# Patient Record
Sex: FEMALE | Race: WHITE | NOT HISPANIC OR LATINO | URBAN - METROPOLITAN AREA
[De-identification: names, ages, dates, MRNs, and addresses within clinical notes are randomized per-mention and may not be internally consistent; named-entity substitution may affect disease eponyms.]

---

## 2017-01-11 ENCOUNTER — INPATIENT (INPATIENT)
Facility: HOSPITAL | Age: 72
LOS: 3 days | Discharge: HOME | End: 2017-01-15
Attending: INTERNAL MEDICINE | Admitting: INTERNAL MEDICINE
Payer: MEDICARE

## 2017-01-11 DIAGNOSIS — Z90.710 ACQUIRED ABSENCE OF BOTH CERVIX AND UTERUS: Chronic | ICD-10-CM

## 2017-01-11 DIAGNOSIS — Z98.89 OTHER SPECIFIED POSTPROCEDURAL STATES: Chronic | ICD-10-CM

## 2017-01-11 PROCEDURE — 86079 PHYS BLOOD BANK SERV AUTHRJ: CPT

## 2017-01-11 PROCEDURE — 71010: CPT | Mod: 26

## 2017-01-11 PROCEDURE — 99291 CRITICAL CARE FIRST HOUR: CPT

## 2017-01-11 PROCEDURE — 93010 ELECTROCARDIOGRAM REPORT: CPT | Mod: 76

## 2017-01-19 DIAGNOSIS — K44.9 DIAPHRAGMATIC HERNIA WITHOUT OBSTRUCTION OR GANGRENE: ICD-10-CM

## 2017-01-19 DIAGNOSIS — T39.395A ADVERSE EFFECT OF OTHER NONSTEROIDAL ANTI-INFLAMMATORY DRUGS [NSAID], INITIAL ENCOUNTER: ICD-10-CM

## 2017-01-19 DIAGNOSIS — I10 ESSENTIAL (PRIMARY) HYPERTENSION: ICD-10-CM

## 2017-01-19 DIAGNOSIS — K31.9 DISEASE OF STOMACH AND DUODENUM, UNSPECIFIED: ICD-10-CM

## 2017-01-19 DIAGNOSIS — K22.70 BARRETT'S ESOPHAGUS WITHOUT DYSPLASIA: ICD-10-CM

## 2017-01-19 DIAGNOSIS — Z79.1 LONG TERM (CURRENT) USE OF NON-STEROIDAL ANTI-INFLAMMATORIES (NSAID): ICD-10-CM

## 2017-01-19 DIAGNOSIS — K31.82 DIEULAFOY LESION (HEMORRHAGIC) OF STOMACH AND DUODENUM: ICD-10-CM

## 2017-01-19 DIAGNOSIS — K92.0 HEMATEMESIS: ICD-10-CM

## 2017-01-19 DIAGNOSIS — K21.0 GASTRO-ESOPHAGEAL REFLUX DISEASE WITH ESOPHAGITIS: ICD-10-CM

## 2017-01-19 DIAGNOSIS — I95.9 HYPOTENSION, UNSPECIFIED: ICD-10-CM

## 2017-01-19 DIAGNOSIS — K25.9 GASTRIC ULCER, UNSPECIFIED AS ACUTE OR CHRONIC, WITHOUT HEMORRHAGE OR PERFORATION: ICD-10-CM

## 2017-01-19 DIAGNOSIS — E78.00 PURE HYPERCHOLESTEROLEMIA, UNSPECIFIED: ICD-10-CM

## 2017-01-19 DIAGNOSIS — D62 ACUTE POSTHEMORRHAGIC ANEMIA: ICD-10-CM

## 2017-01-19 DIAGNOSIS — E66.9 OBESITY, UNSPECIFIED: ICD-10-CM

## 2017-01-30 DIAGNOSIS — K92.2 GASTROINTESTINAL HEMORRHAGE, UNSPECIFIED: ICD-10-CM

## 2017-03-08 ENCOUNTER — TRANSCRIPTION ENCOUNTER (OUTPATIENT)
Age: 72
End: 2017-03-08

## 2017-04-04 ENCOUNTER — RESULT REVIEW (OUTPATIENT)
Age: 72
End: 2017-04-04

## 2018-06-05 ENCOUNTER — RECORD ABSTRACTING (OUTPATIENT)
Age: 73
End: 2018-06-05

## 2018-06-06 ENCOUNTER — RECORD ABSTRACTING (OUTPATIENT)
Age: 73
End: 2018-06-06

## 2018-06-06 DIAGNOSIS — S86.112D STRAIN OF OTHER MUSCLE(S) AND TENDON(S) OF POSTERIOR MUSCLE GROUP AT LOWER LEG LEVEL, LEFT LEG, SUBSEQUENT ENCOUNTER: ICD-10-CM

## 2018-06-06 DIAGNOSIS — S86.812A STRAIN OF OTHER MUSCLE(S) AND TENDON(S) AT LOWER LEG LEVEL, LEFT LEG, INITIAL ENCOUNTER: ICD-10-CM

## 2018-06-06 DIAGNOSIS — Z87.39 PERSONAL HISTORY OF OTHER DISEASES OF THE MUSCULOSKELETAL SYSTEM AND CONNECTIVE TISSUE: ICD-10-CM

## 2018-06-06 DIAGNOSIS — M76.50 PATELLAR TENDINITIS, UNSPECIFIED KNEE: ICD-10-CM

## 2018-06-06 DIAGNOSIS — M23.203 DERANGEMENT OF UNSPECIFIED MEDIAL MENISCUS DUE TO OLD TEAR OR INJURY, RIGHT KNEE: ICD-10-CM

## 2018-06-06 DIAGNOSIS — Z86.79 PERSONAL HISTORY OF OTHER DISEASES OF THE CIRCULATORY SYSTEM: ICD-10-CM

## 2018-06-06 DIAGNOSIS — Z78.9 OTHER SPECIFIED HEALTH STATUS: ICD-10-CM

## 2018-06-06 DIAGNOSIS — Z87.81 PERSONAL HISTORY OF (HEALED) TRAUMATIC FRACTURE: ICD-10-CM

## 2018-06-06 DIAGNOSIS — Z82.49 FAMILY HISTORY OF ISCHEMIC HEART DISEASE AND OTHER DISEASES OF THE CIRCULATORY SYSTEM: ICD-10-CM

## 2018-06-06 DIAGNOSIS — S86.119A STRAIN OF OTHER MUSCLE(S) AND TENDON(S) OF POSTERIOR MUSCLE GROUP AT LOWER LEG LEVEL, UNSPECIFIED LEG, INITIAL ENCOUNTER: ICD-10-CM

## 2018-06-06 DIAGNOSIS — S83.289A OTHER TEAR OF LATERAL MENISCUS, CURRENT INJURY, UNSPECIFIED KNEE, INITIAL ENCOUNTER: ICD-10-CM

## 2018-06-06 DIAGNOSIS — S83.282A OTHER TEAR OF LATERAL MENISCUS, CURRENT INJURY, LEFT KNEE, INITIAL ENCOUNTER: ICD-10-CM

## 2018-06-06 DIAGNOSIS — Z86.39 PERSONAL HISTORY OF OTHER ENDOCRINE, NUTRITIONAL AND METABOLIC DISEASE: ICD-10-CM

## 2018-06-06 DIAGNOSIS — S83.249A OTHER TEAR OF MEDIAL MENISCUS, CURRENT INJURY, UNSPECIFIED KNEE, INITIAL ENCOUNTER: ICD-10-CM

## 2018-06-06 RX ORDER — ATENOLOL 25 MG/1
25 TABLET ORAL DAILY
Refills: 0 | Status: ACTIVE | COMMUNITY

## 2018-06-06 RX ORDER — ROSUVASTATIN CALCIUM 10 MG/1
10 TABLET, FILM COATED ORAL DAILY
Refills: 0 | Status: ACTIVE | COMMUNITY

## 2018-06-06 RX ORDER — VALSARTAN AND HYDROCHLOROTHIAZIDE 320; 25 MG/1; MG/1
320-25 TABLET, FILM COATED ORAL DAILY
Refills: 0 | Status: ACTIVE | COMMUNITY

## 2018-07-20 ENCOUNTER — APPOINTMENT (OUTPATIENT)
Dept: ORTHOPEDIC SURGERY | Facility: CLINIC | Age: 73
End: 2018-07-20
Payer: MEDICARE

## 2018-07-20 VITALS
HEART RATE: 56 BPM | BODY MASS INDEX: 35.7 KG/M2 | WEIGHT: 194 LBS | HEIGHT: 62 IN | DIASTOLIC BLOOD PRESSURE: 80 MMHG | SYSTOLIC BLOOD PRESSURE: 122 MMHG

## 2018-07-20 DIAGNOSIS — R07.81 PLEURODYNIA: ICD-10-CM

## 2018-07-20 DIAGNOSIS — S29.011A STRAIN OF MUSCLE AND TENDON OF FRONT WALL OF THORAX, INITIAL ENCOUNTER: ICD-10-CM

## 2018-07-20 PROCEDURE — 99214 OFFICE O/P EST MOD 30 MIN: CPT

## 2018-07-20 PROCEDURE — 71100 X-RAY EXAM RIBS UNI 2 VIEWS: CPT | Mod: RT

## 2018-12-07 ENCOUNTER — APPOINTMENT (OUTPATIENT)
Dept: ORTHOPEDIC SURGERY | Facility: CLINIC | Age: 73
End: 2018-12-07
Payer: MEDICARE

## 2018-12-07 VITALS — BODY MASS INDEX: 35.7 KG/M2 | WEIGHT: 194 LBS | HEIGHT: 62 IN

## 2018-12-07 PROCEDURE — 99213 OFFICE O/P EST LOW 20 MIN: CPT

## 2019-03-12 ENCOUNTER — TRANSCRIPTION ENCOUNTER (OUTPATIENT)
Age: 74
End: 2019-03-12

## 2019-03-15 ENCOUNTER — APPOINTMENT (OUTPATIENT)
Dept: ORTHOPEDIC SURGERY | Facility: CLINIC | Age: 74
End: 2019-03-15
Payer: MEDICARE

## 2019-03-15 VITALS — WEIGHT: 194 LBS | BODY MASS INDEX: 35.7 KG/M2 | HEIGHT: 62 IN

## 2019-03-15 DIAGNOSIS — M43.22 FUSION OF SPINE, CERVICAL REGION: ICD-10-CM

## 2019-03-15 PROCEDURE — 99213 OFFICE O/P EST LOW 20 MIN: CPT

## 2019-06-21 ENCOUNTER — APPOINTMENT (OUTPATIENT)
Dept: ORTHOPEDIC SURGERY | Facility: CLINIC | Age: 74
End: 2019-06-21
Payer: MEDICARE

## 2019-06-21 VITALS — BODY MASS INDEX: 35.7 KG/M2 | HEIGHT: 62 IN | WEIGHT: 194 LBS

## 2019-06-21 PROCEDURE — 99213 OFFICE O/P EST LOW 20 MIN: CPT

## 2019-06-21 NOTE — PHYSICAL EXAM
[LE] : Sensory: Intact in bilateral lower extremities [de-identified] : Constitutional\par o Appearance : well-nourished, well developed, alert, in no acute distress \par Head and Face\par o Head :\par ¦ Inspection : atraumatic, normocephalic\par o Face :\par ¦ Inspection : no visible rash or discoloration\par Respiratory\par o Respiratory Effort: breathing unlabored \par Neurologic\par o Mental Status Examination :\par ¦ Orientation : alert and oriented X 3\par Psychiatric\par o Mood and Affect: mood normal, affect appropriate\par Cardiovascular\par o Observation/Palpation : - no swelling\par Lymphatic\par o Additional Nodes : No palpable lymph nodes present\par \par Right Ribs\par o Inspection/Palpation : normal appearance, mild intercostal tenderness posterior to anterior 6-8th ribs\par o Stability : no rib instability present on palpation, mild pain with compression\par \par Lumbosacral Spine\par o Inspection : no visible rash or discoloration\par o Palpation : paraspinal musculature minimally tender\par o Range of Motion : extension and sidebending to the right causes discomfort and right leg pain, forward flexion does not cause discomfort, rotation to right gives pain to right knee\par o Muscle Strength : paraspinal muscle strength and tone within normal limits\par o Muscle Tone : paraspinal muscle strength and tone within normal limits\par Tests: straight leg test negative  on the left mildly positive on the right, positive keily on left, \par \par hamstring: well stretched \par  \par Right Lower Extremity\par o Buttock : no tenderness, swelling or deformities\par o Right Hip :\par ¦ Inspection/Palpation : no tenderness, swelling or deformities\par ¦ Range of Motion : full and painless in all planes, no crepitance\par ¦ Stability : joint stability intact\par ¦ Strength : extension, flexion, adduction, abduction, internal rotation and external rotation 4+/5 \par \par o Right Knee :\par ¦ Inspection/Palpation : Peripatellar tenderness to palpation, no swelling\par ¦ Range of Motion : active flexion and extension full and painless, no crepitance\par ¦ Stability : no valgus or varus instability present on provocative testing\par ¦ Strength : flexion and extension 5/5\par ¦ Tests and Signs : Anterior Drawer negative, Lachman negative, Jacques's negative \par o Reflexes : patellar tendon reflex 2+, ankle reflex 2+, Babinski sign absent (toes downgoing) \par \par Left Lower Extremity\par o Buttock : no tenderness, swelling or deformities \par o Left Hip :\par ¦ Inspection/Palpation : no tenderness, swelling or deformities\par ¦ Range of Motion : full and painless in all planes, no crepitance\par ¦ Stability : joint stability intact\par ¦ Strength : extension, flexion, adduction, abduction, internal rotation and external rotation 4+/5\par \par o Left Knee :\par ¦ Inspection/Palpation : medial joint line and peripatellar tenderness to palpation, no swelling\par ¦ Range of Motion : active flexion and extension full and painless, no crepitance\par ¦ Stability : no valgus or varus instability present on provocative testing\par ¦ Strength : flexion and extension 5/5\par ¦ Tests and Signs : Anterior Drawer negative, Lachman negative, Jacques's negative, Keily's test positive with pain radiating down right leg\par o Reflexes : patellar tendon reflex 2+, ankle reflex 2+, Babinski sign absent (toes downgoing) \par  \par Gait: gait normal, no significant extremity swelling or lymphedema, excellent core strength, No foot drop bilaterally.\par

## 2019-06-21 NOTE — DISCUSSION/SUMMARY
[de-identified] : She will continue with physical therapy for strengthening and flexibility of her lower back. A prescription for physical therapy was provided. We discussed the use of ice and Tylenol to relieve pain. I encouraged her to start walking when the weather gets warmer and walk with a walking stick. She'll follow up in 2 months. I recommend sneakers or reinforced sandals with a strap. Do log rolls in bed to prevent radiating pain. \par \par

## 2019-06-21 NOTE — HISTORY OF PRESENT ILLNESS
[de-identified] : Patient has been going to physical therapy for her lower back pain and she reports that she is feeling significantly better. She is diligent with doing the exercises on her own. She would like to continue going to PT. She has received an Epidural injection over 3 years ago. She is doing leg extension and leg presses at physical therapy, which doesn’t hurt knees or back. There are no exercises in therapy hurting knees or back. She states she wants to continue the therapy.  She iis going to [place] in August and is concerned about the extensive walking.

## 2019-06-21 NOTE — ADDENDUM
[FreeTextEntry1] : I, Meek Mclaughlin, acted solely as a scribe for Dr. Lopez on 06/21/2019  .\par  \par All medical record entries made by the scribe were at my, Dr. Lopez, direction and personally dictated by me on 06/21/2019. I have reviewed the chart and agree that the record accurately reflects my personal performance of the history, physical exam, assessment and plan. I have also personally directed, reviewed, and agreed with the chart.\par

## 2019-09-13 ENCOUNTER — APPOINTMENT (OUTPATIENT)
Dept: ORTHOPEDIC SURGERY | Facility: CLINIC | Age: 74
End: 2019-09-13
Payer: MEDICARE

## 2019-09-13 VITALS — HEIGHT: 62 IN | WEIGHT: 194 LBS | BODY MASS INDEX: 35.7 KG/M2

## 2019-09-13 PROCEDURE — 99213 OFFICE O/P EST LOW 20 MIN: CPT

## 2019-09-13 PROCEDURE — 73562 X-RAY EXAM OF KNEE 3: CPT | Mod: LT

## 2019-09-13 PROCEDURE — 72170 X-RAY EXAM OF PELVIS: CPT

## 2019-09-13 PROCEDURE — 72100 X-RAY EXAM L-S SPINE 2/3 VWS: CPT

## 2020-01-03 ENCOUNTER — APPOINTMENT (OUTPATIENT)
Dept: ORTHOPEDIC SURGERY | Facility: CLINIC | Age: 75
End: 2020-01-03
Payer: MEDICARE

## 2020-01-03 PROCEDURE — 99213 OFFICE O/P EST LOW 20 MIN: CPT

## 2020-01-03 NOTE — ADDENDUM
[FreeTextEntry1] : I, Meek Mclaughlin, acted solely as a scribe for Dr. Lopez on 01/03/2020  .\par  \par All medical record entries made by the scribe were at my, Dr. Lopez, direction and personally dictated by me on 01/03/2020. I have reviewed the chart and agree that the record accurately reflects my personal performance of the history, physical exam, assessment and plan. I have also personally directed, reviewed, and agreed with the chart.

## 2020-01-03 NOTE — DISCUSSION/SUMMARY
[de-identified] : I went over the pathopsychology of the patient's symptoms in great detail and used models to aid in my explanation. I am recommending the patient continue going to physical therapy to obtain increases in their strength and mobility of lumbar spine and knee. A prescription for PT was provided to the patient.  If there is no significant improvement upon a month of therapy repeat MRI of her lumbosacral spine may be considered.\par \par  FU 6 weeks.

## 2020-01-03 NOTE — PHYSICAL EXAM
[LE] : Sensory: Intact in bilateral lower extremities [de-identified] : Doloris:\par \par Constitutional\par o Appearance : well-nourished, well developed, alert, in no acute distress \par Head and Face\par o Head :\par ¦ Inspection : atraumatic, normocephalic\par o Face :\par ¦ Inspection : no visible rash or discoloration\par Respiratory\par o Respiratory Effort: breathing unlabored \par Neurologic\par o Mental Status Examination :\par ¦ Orientation : alert and oriented X 3\par Psychiatric\par o Mood and Affect: mood normal, affect appropriate\par Cardiovascular\par o Observation/Palpation : - no swelling\par Lymphatic\par o Additional Nodes : No palpable lymph nodes present\par \par Right Ribs\par o Inspection/Palpation : normal appearance, mild intercostal tenderness posterior to anterior 6-8th ribs\par o Stability : no rib instability present on palpation, mild pain with compression\par \par Lumbosacral Spine\par o Inspection : no visible rash or discoloration, no tenderness lumbar spine \par o Palpation : paraspinal musculature minimally tender\par o Range of Motion : extension and  flexion causes pain down right leg, sidebending to the right causes discomfort, flexion relieves pain, rotation to right is worse than left\par o Muscle Strength : paraspinal muscle strength and tone within normal limits\par o Muscle Tone : paraspinal muscle strength and tone within normal limits\par Tests: straight leg test negative  \par hamstring: are tight \par  \par Right Lower Extremity\par o Buttock : no tenderness, swelling or deformities\par o Right Hip :\par ¦ Inspection/Palpation : no tenderness, swelling or deformities\par ¦ Range of Motion : full and painless in all planes, no crepitance\par ¦ Stability : joint stability intact\par ¦ Strength : extension, flexion, adduction, abduction, internal rotation and external rotation 4+/5 \par \par o Right Knee :\par ¦ Inspection/Palpation : lateral compartment tenderness to palpation, no swelling\par ¦ Range of Motion : active flexion and extension full and painless, no crepitance\par ¦ Stability : no valgus or varus instability present on provocative testing\par ¦ Strength : flexion and extension 4-/5\par ¦ Tests and Signs : Anterior Drawer negative, Lachman negative, Jacques's negative \par o Reflexes : patellar tendon reflex 2+, ankle reflex 2+, Babinski sign absent (toes downgoing) \par \par Left Lower Extremity\par o Buttock : no tenderness, swelling or deformities \par o Left Hip :\par ¦ Inspection/Palpation : no tenderness, swelling or deformities, left greater trochanter tenderness \par ¦ Range of Motion : full flexion slight limitation of rotation,\par ¦ Stability : joint stability intact\par ¦ Strength : extension, flexion, adduction, abduction, internal rotation and external rotation 4+/5\par \par o Left Knee :\par ¦ Inspection/Palpation : medial joint line and peripatellar tenderness to palpation, no swelling\par ¦ Range of Motion : active flexion and extension full and painless, no crepitance\par ¦ Stability : no valgus or varus instability present on provocative testing\par ¦ Strength : flexion and extension 4-/5\par ¦ Tests and Signs : Anterior Drawer negative, Lachman negative, Jacques's negative, Viktor's test positive with pain radiating down right leg\par o Reflexes : patellar tendon reflex 2+, ankle reflex 2+, Babinski sign absent (toes downgoing) \par  \par Gait: kyphotic ,  no significant swelling extremity swelling or lymphedema\par

## 2020-01-03 NOTE — HISTORY OF PRESENT ILLNESS
[de-identified] : 73 year old patient presents with lower back pain. Patient has been going to physical therapy for her lower back pain and she reports that she is feeling significantly better. She is diligent with doing the exercises on her own. She would like to continue going to PT.  She has been able to walk long distances with a walking stick. She is having pain on the anterior aspect of her left knee. She states she has been bending and doing holiday decoration frequently. The patient states that She has difficulty when ascending and descending stairs. She has been diligent with her home exercises. She is present with her . Previous xrays patellofemoral and medial compartment arthritis left knee are f rom 9/2019 .

## 2020-02-28 ENCOUNTER — APPOINTMENT (OUTPATIENT)
Dept: ORTHOPEDIC SURGERY | Facility: CLINIC | Age: 75
End: 2020-02-28
Payer: MEDICARE

## 2020-02-28 PROCEDURE — 73562 X-RAY EXAM OF KNEE 3: CPT | Mod: RT

## 2020-02-28 PROCEDURE — 20611 DRAIN/INJ JOINT/BURSA W/US: CPT | Mod: RT

## 2020-02-28 PROCEDURE — 99214 OFFICE O/P EST MOD 30 MIN: CPT | Mod: 25

## 2020-06-19 ENCOUNTER — APPOINTMENT (OUTPATIENT)
Dept: ORTHOPEDIC SURGERY | Facility: CLINIC | Age: 75
End: 2020-06-19
Payer: MEDICARE

## 2020-06-19 VITALS — BODY MASS INDEX: 35.7 KG/M2 | WEIGHT: 194 LBS | HEIGHT: 62 IN

## 2020-06-19 PROCEDURE — 99213 OFFICE O/P EST LOW 20 MIN: CPT

## 2020-06-19 NOTE — PHYSICAL EXAM
[LE] : Sensory: Intact in bilateral lower extremities [de-identified] : Constitutional\par o Appearance : well-nourished, well developed, alert, in no acute distress \par Head and Face\par o Head :\par ¦ Inspection : atraumatic, normocephalic\par o Face :\par ¦ Inspection : no visible rash or discoloration\par Respiratory\par o Respiratory Effort: breathing unlabored \par Neurologic\par o Mental Status Examination :\par ¦ Orientation : alert and oriented X 3\par Psychiatric\par o Mood and Affect: mood normal, affect appropriate\par Cardiovascular\par o Observation/Palpation : - no swelling\par Lymphatic\par o Additional Nodes : No palpable lymph nodes present\par \par Lumbosacral Spine\par o Inspection : no visible rash or discoloration\par o Palpation : no paraspinal musculature tenderness\par o Range of Motion : arc of motion full in all planes, no crepitance with ROM, extension and sidebending to the left and right cause discomfort, no pain with rotation\par o Muscle Strength : paraspinal muscle strength and tone within normal limits\par o Muscle Tone : paraspinal muscle strength and tone within normal limits\par Tests: straight leg test negative and LIAT test negative bilaterally\par \par Right Lower Extremity\par o Buttock : no tenderness, swelling or deformities \par o Right Hip :\par ¦ Inspection/Palpation : no tenderness, swelling or deformities\par ¦ Range of Motion : full and painless in all planes, no crepitance\par ¦ Stability : joint stability intact\par ¦ Strength : extension, flexion, adduction, abduction, internal rotation and external rotation, 4+/5\par \par o Right Knee :\par ¦ Inspection/Palpation : mild medial and lateral compartment tenderness to palpation, no swelling, valgus deformity, arthroscopic portals are well healed. \par ¦ Range of Motion : active flexion and extension full, pain on full flexion, patellofemoral  crepitance\par ¦ Stability : no valgus or varus instability present on provocative testing\par ¦ Strength : flexion and extension 4+/5\par ¦ Tests and Signs : negative Anterior Drawer, negative Lachman, negative Jacques\par \par Left Lower Extremity\par o Buttock : no tenderness, swelling or deformities \par o Left Hip :\par ¦ Inspection/Palpation : no tenderness, no swelling or deformities\par ¦ Range of Motion : full and painless in all planes, no crepitance\par ¦ Stability : joint stability intact\par ¦ Strength : extension, flexion, adduction, abduction, internal rotation and external rotation, 4+/5\par \par o Left Knee :\par ¦ Inspection/Palpation : medial and lateral compartment tenderness to palpation, no swelling, valgus deformity\par ¦ Range of Motion : active flexion and extension full and painless, no crepitance\par ¦ Stability : no valgus or varus instability present on provocative testing\par ¦ Strength : flexion and extension 4+/5\par ¦ Tests and Signs : negative Anterior Drawer, negative Lachman, negative Jacques\par \par Gait and Station:\par Gait: gait normal, no significant extremity swelling or lymphedema, good proprioception and balance, no foot drop bilaterally, good core strength, valgus deformity bilateral knees

## 2020-06-19 NOTE — HISTORY OF PRESENT ILLNESS
[de-identified] : 74 year old female presents for follow up after receiving Monovisc in her right knee on 2/28/2020. She is known to have bone on bone in the lateral compartment of her right knee. She has minimal pain in her right knee. She is thrilled with her progress and results. She also notes that her low back is feeling great, and she has not received back injections recently. She has been working diligently with a home exercise program and reports that she has maintained her strength and ROM. She has been walking 3 miles a day using a walking stick without pain. She no longer has radiating pain down her legs. She notes that she has been out of PT due to COVID-19, but would like a prescription to start it again.  She notes she is thrilled with her result and is able to walk long distances without significant discomfort. She is in the process of trying to lose weight as well.\par \par Radiology Results taken 02/28/2020 revealed:\par o Right Knee : Standing AP,lateral and tunnel views of the knee were obtained and revealed bone on bone in the lateral compartment with significant patellofemoral arthritis

## 2020-10-02 ENCOUNTER — APPOINTMENT (OUTPATIENT)
Dept: ORTHOPEDIC SURGERY | Facility: CLINIC | Age: 75
End: 2020-10-02
Payer: MEDICARE

## 2020-10-02 DIAGNOSIS — M17.10 UNILATERAL PRIMARY OSTEOARTHRITIS, UNSPECIFIED KNEE: ICD-10-CM

## 2020-10-02 PROCEDURE — 99214 OFFICE O/P EST MOD 30 MIN: CPT

## 2020-10-02 PROCEDURE — 73562 X-RAY EXAM OF KNEE 3: CPT | Mod: RT

## 2021-01-15 ENCOUNTER — APPOINTMENT (OUTPATIENT)
Dept: ORTHOPEDIC SURGERY | Facility: CLINIC | Age: 76
End: 2021-01-15
Payer: MEDICARE

## 2021-01-15 PROCEDURE — 20611 DRAIN/INJ JOINT/BURSA W/US: CPT | Mod: RT

## 2021-01-15 PROCEDURE — 99214 OFFICE O/P EST MOD 30 MIN: CPT | Mod: 25

## 2021-04-30 ENCOUNTER — APPOINTMENT (OUTPATIENT)
Dept: ORTHOPEDIC SURGERY | Facility: CLINIC | Age: 76
End: 2021-04-30
Payer: MEDICARE

## 2021-04-30 PROCEDURE — 99214 OFFICE O/P EST MOD 30 MIN: CPT

## 2021-04-30 NOTE — PHYSICAL EXAM
[LE] : Sensory: Intact in bilateral lower extremities [de-identified] : Constitutional\par o Appearance : well-nourished, well developed, alert, in no acute distress \par Head and Face\par o Head :\par ¦ Inspection : atraumatic, normocephalic\par o Face :\par ¦ Inspection : no visible rash or discoloration\par Respiratory\par o Respiratory Effort: breathing unlabored \par Neurologic\par o Mental Status Examination :\par ¦ Orientation : alert and oriented X 3\par Psychiatric\par o Mood and Affect: mood normal, affect appropriate\par Cardiovascular\par o Observation/Palpation : - no swelling\par Lymphatic\par o Additional Nodes : No palpable lymph nodes present\par \par Lumbosacral Spine\par o Inspection : no visible rash or discoloration\par o Palpation : no paraspinal musculature tenderness\par o Range of Motion : extension and sidebending and rotation to the right cause discomfort that radiates to the right ankle\par o Muscle Strength : paraspinal muscle strength and tone within normal limits\par o Muscle Tone : paraspinal muscle strength and tone within normal limits\par Tests: straight leg test negative and LIAT test negative bilaterally\par \par Right Lower Extremity\par o Buttock : no tenderness, swelling or deformities \par o Right Hip :\par ¦ Inspection/Palpation : no tenderness, swelling or deformities\par ¦ Range of Motion : full and painless in all planes, no crepitance\par ¦ Stability : joint stability intact\par ¦ Strength : extension, flexion, adduction, abduction, internal rotation and external rotation, 4+/5\par \par o Right Knee :\par ¦ Inspection/Palpation : medial and mild lateral compartment tenderness, no swelling, no warmth, valgus deformity, well-healed arthroscopic portals \par ¦ Range of Motion : FROM, pain with full flexion, patellofemoral crepitance with painful patellofemoral glide\par ¦ Stability : no valgus or varus instability present on provocative testing\par ¦ Strength : flexion and extension 4+/5\par ¦ Tests and Signs : negative Anterior Drawer, negative Lachman, negative Jacques\par \par Left Lower Extremity\par o Buttock : no tenderness, swelling or deformities \par o Left Hip :\par ¦ Inspection/Palpation : no tenderness, no swelling or deformities\par ¦ Range of Motion : full and painless in all planes, no crepitance\par ¦ Stability : joint stability intact\par ¦ Strength : extension, flexion, adduction, abduction, internal rotation and external rotation, 4+/5\par \par o Left Knee :\par ¦ Inspection/Palpation : medial and lateral compartment tenderness to palpation, no swelling, valgus deformity\par ¦ Range of Motion : active flexion and extension full and painless, no crepitance\par ¦ Stability : no valgus or varus instability present on provocative testing\par ¦ Strength : flexion and extension 4+/5\par ¦ Tests and Signs : negative Anterior Drawer, negative Lachman, negative Jacques\par \par o Left Ankle :\par ¦ Inspection/Palpation : midfoot tenderness, no posterior tibial tendon tenderness, no swelling    \par ¦ Range of Motion : arc of motion full and painless in all planes\par ¦ Stability : no joint instability en provocative testing\par ¦ Strength : all muscles 5/5\par o Skin : no erythema or ecchymosis present\par o Sensation : sensation to pin intact\par o Tests and Signs : Doyle test negative \par \par o Left Foot:\par ¦ Inspection/Palpation : midfoot tenderness to palpation, no swelling, hammertoes of all toes\par ¦ Range of Motion : arc of motion full and painless in all planes\par ¦ Stability : no joint instability on provocative testing\par ¦ Strength : all muscles 5/5\par o Skin : no erythema or ecchymosis present \par \par Gait and Station:\par Gait: gait normal, valgus deformity of both knees, mild planovalgus deformity bilaterally, pain with heel and toe walking on the left, no significant extremity swelling or lymphedema, good proprioception and balance, no foot drop, good core strength

## 2021-04-30 NOTE — HISTORY OF PRESENT ILLNESS
[de-identified] : 75 year old female presents with right knee pain. She received a Monovisc injection on 1/15/2021 and reports relief. She is still going to PT for her knee and back and feels it is very helpful. She notes her back did act up after doing a lot of bending and twisting, but it has quieted down. She notes that about 1.5 months ago she fell onto both knees. She is working on losing weight. \par She is also complaining of left foot and ankle pain since January. There was no injury. She notes pain that starts in the foot and shoots up into the ankle. She has been seeing a podiatrist (Dr. Jeramie Mackay). He told her that she needed custom orthotics but they did not help. He took the orthotics back to adjust and shave them down. She has been using OTC arch supports. She is also in PT for her ankle without relief. 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 \par \par MRI of the Left Ankle obtained at [facility] on 2/11/2021 revealed:\par 1. Flattened appearance of the peroneus brevis at the level of the lateral malleolus, consistent with the presence of a longitudinal split.\par 2. Mild tibialis posterior tendinosis.\par 3. Mild soft tissue edema around the ankle.\par 4. Mild degenerative changes in the midfoot.\par \par Radiology Results done 10/2/2020:\par o Right Knee : Standing AP,lateral and tunnel views of the knee were obtained and revealed severe lateral and patellofemoral arthritis.

## 2021-04-30 NOTE — DISCUSSION/SUMMARY
[de-identified] : I went over the pathophysiology of the patient's symptoms in great detail with the patient. We discussed the use of ice, Tylenol and anti-inflammatories to relieve pain. I stressed the importance of continued weight loss and its benefits to the patient’s joints and overall health. I am recommending the patient continue going to physical therapy to obtain increases in their strength and mobility. A prescription for PT was provided. \par Regarding her ankle: I discussed the underlying pathophysiology of the patient's condition in great detail with the patient. I went over the patient's x-rays and MRI with them in great detail. I discussed ergonomic solutions with the patient such as wearing a stiff shoe with a thicker sole. We will also work on her foot and ankle in PT. She should bring the orthotics to her next visit. If her pain does not improve with further PT and her orthotics, then a cortisone injection may be considered. All of her questions were answered. She understands and consents to the plan. \par \par FU 1 month.\par after continuing PT for her right knee, low back and left foot and ankle.\par \par The patient is an 75 year old female with bone on bone arthritis of their right knee. Based upon the patient's continued symptoms and failure to respond to conservative treatment I have recommended a total knee replacement for this patient. A long discussion took place with the patient describing what a total joint replacement is and what the procedure would entail. A total knee model, similar to the implant that will be used during the operation was utilized to demonstrate and to discuss the various bearing surfaces of the implants. The hospitalization and post-operative care and rehabilitation were also discussed. The use of perioperative antibiotics and DVT prophylaxis were discussed. The risk, benefits and alternatives to a surgical intervention were discussed at length with the patient. The patient was also advised of risks related to the medical comorbidities and elevated body mass index (BMI). A lengthy discussion took place to review the most common complications including but not limited to: deep vein thrombosis, pulmonary embolus, heart attack, stroke, infection, wound breakdown, numbness, damage to nerves, tendon, muscles, arteries or other blood vessels, death and other possible complications from anesthesia. The patient was told that we will take steps to minimize these risks by using sterile technique, antibiotics and DVT prophylaxis when appropriate and follow the patient postoperatively in the office setting to monitor progress. The possibility of recurrent pain, no improvement in pain and actual worsening of pain were also discussed with the patient.\par \par The discharge plan of care focused on the patient going home following surgery. I encouraged the patient to make the necessary arrangements to have someone stay with them when they are discharged home. Following discharge, a home care nurse will visit the patient. They will open your home care case and request home physical therapy services. Home physical therapy will commence following discharge provided it is appropriate and covered by her health insurance benefit plan.\par \par The risks, benefits and alternative treatment options of total joint replacement were reviewed with the patient at great length. All questions were answered to the satisfaction of the patient. The patient participated in an interactive discussion of the total knee replacement implant planned for their surgery with questions answered. The patient agreed with the treatment plan, and has decided to move forward with the elective total knee replacement as planned.\par \par DELPHINE BEAULIEU was seen face to face and needs a commode for bedside use as the patient has no ability to acces the bathroom in their home. The patient also requires a rolling walker as this is needed for activities of daily living within their home secondary to the diagnosis of osteoarthritis.

## 2021-04-30 NOTE — REASON FOR VISIT
[Follow-Up Visit] : a follow-up visit for [Knee Pain] : knee pain [Spouse] : spouse [FreeTextEntry2] : left ankle and foot pain

## 2021-04-30 NOTE — ADDENDUM
[FreeTextEntry1] : I, Von Rivera, acted solely as a scribe for Dr. Fracisco Lopez on this date 04/30/2021.\par All medical record entries made by the Scribe were at my, Dr. Fracisco Lopez, direction and personally dictated by me on 04/30/2021. I have reviewed the chart and agree that the record accurately reflects my personal performance of the history, physical exam, assessment and plan. I have also personally directed, reviewed, and agreed with the chart.

## 2021-06-11 ENCOUNTER — APPOINTMENT (OUTPATIENT)
Dept: ORTHOPEDIC SURGERY | Facility: CLINIC | Age: 76
End: 2021-06-11
Payer: MEDICARE

## 2021-06-11 VITALS
DIASTOLIC BLOOD PRESSURE: 86 MMHG | SYSTOLIC BLOOD PRESSURE: 162 MMHG | WEIGHT: 168 LBS | HEIGHT: 61 IN | HEART RATE: 61 BPM | BODY MASS INDEX: 31.72 KG/M2

## 2021-06-11 PROCEDURE — 20605 DRAIN/INJ JOINT/BURSA W/O US: CPT | Mod: LT

## 2021-06-11 PROCEDURE — 99214 OFFICE O/P EST MOD 30 MIN: CPT | Mod: 25

## 2021-06-11 NOTE — PHYSICAL EXAM
[LE] : Sensory: Intact in bilateral lower extremities [de-identified] : Constitutional\par o Appearance : well-nourished, well developed, alert, in no acute distress \par Head and Face\par o Head :\par ¦ Inspection : atraumatic, normocephalic\par o Face :\par ¦ Inspection : no visible rash or discoloration\par Respiratory\par o Respiratory Effort: breathing unlabored \par Neurologic\par o Mental Status Examination :\par ¦ Orientation : alert and oriented X 3\par Psychiatric\par o Mood and Affect: mood normal, affect appropriate\par Cardiovascular\par o Observation/Palpation : - no swelling\par Lymphatic\par o Additional Nodes : No palpable lymph nodes present\par \par Lumbosacral Spine\par o Inspection : no visible rash or discoloration\par o Palpation : no paraspinal musculature tenderness\par o Range of Motion : extension and sidebending and rotation to the right cause discomfort that radiates to the right ankle\par o Muscle Strength : paraspinal muscle strength and tone within normal limits\par o Muscle Tone : paraspinal muscle strength and tone within normal limits\par Tests: straight leg test negative and LIAT test negative bilaterally\par \par Right Lower Extremity\par o Buttock : no tenderness, swelling or deformities \par o Right Hip :\par ¦ Inspection/Palpation : no tenderness, swelling or deformities\par ¦ Range of Motion : full and painless in all planes, no crepitance\par ¦ Stability : joint stability intact\par ¦ Strength : extension, flexion, adduction, abduction, internal rotation and external rotation, 4+/5\par \par o Right Knee :\par ¦ Inspection/Palpation : medial and mild lateral compartment tenderness, no swelling, no warmth, valgus deformity, well-healed arthroscopic portals \par ¦ Range of Motion : FROM, pain with full flexion, patellofemoral crepitance with painful patellofemoral glide\par ¦ Stability : no valgus or varus instability present on provocative testing\par ¦ Strength : flexion and extension 4+/5\par ¦ Tests and Signs : negative Anterior Drawer, negative Lachman, negative Jacques\par \par Left Lower Extremity\par o Buttock : no tenderness, swelling or deformities \par o Left Hip :\par ¦ Inspection/Palpation : no tenderness, no swelling or deformities\par ¦ Range of Motion : full and painless in all planes, no crepitance\par ¦ Stability : joint stability intact\par ¦ Strength : extension, flexion, adduction, abduction, internal rotation and external rotation, 4+/5\par \par o Left Knee :\par ¦ Inspection/Palpation : medial and lateral compartment tenderness to palpation, no swelling, valgus deformity\par ¦ Range of Motion : active flexion and extension full and painless, no crepitance\par ¦ Stability : no valgus or varus instability present on provocative testing\par ¦ Strength : flexion and extension 4+/5\par ¦ Tests and Signs : negative Anterior Drawer, negative Lachman, negative Jacques\par \par o Left Ankle :\par ¦ Inspection/Palpation : tenderness over the posterior tibial tendon and over the insertion into the navicular, mild tenderness over the peroneal tendon, no swelling    \par ¦ Range of Motion : arc of motion full and painless in all planes\par ¦ Stability : no joint instability en provocative testing\par ¦ Strength : all muscles 5/5\par o Skin : no erythema or ecchymosis present\par o Sensation : sensation to pin intact\par o Tests and Signs : Doyle test negative \par \par o Left Foot:\par ¦ Inspection/Palpation : midfoot tenderness to palpation, no swelling, hammertoes of all toes\par ¦ Range of Motion : arc of motion full and painless in all planes\par ¦ Stability : no joint instability on provocative testing\par ¦ Strength : all muscles 5/5\par o Skin : no erythema or ecchymosis present \par \par Gait and Station:\par Gait: stiff with poor pushoff on the left, valgus deformity of both knees, mild planovalgus deformity bilaterally that is not well corrected by her orthotics, pain with heel and toe walking on the left, no significant extremity swelling or lymphedema, good proprioception and balance, no foot drop, good core strength\par \par o Left Ankle injection : Anatomic location- left area of the posterior tibial tendon, Spray - area was sterilized with Betadine and alcohol and anesthetized with Ethyl Chloride, needle used-20G, Medications given- 1cc's lidocaine, 0.5cc's kenalog, 0.5 cc's dexamethasone, and patient tolerated it well.

## 2021-06-11 NOTE — DISCUSSION/SUMMARY
[de-identified] : I went over the pathophysiology of the patient's symptoms in great detail with the patient. We discussed the use of ice, Tylenol, anti-inflammatories, lidocaine patches and Voltaren gel to relieve pain. The patient elected to receive a cortisone injection into her left ankle today, and tolerated it well. I instructed the patient on ROM exercises, and told them to take it easy. The use of ice and rest was reviewed with the patient. The patient may resume activities tomorrow. A discussion ensued about shoe wear modifications and I recommend that she continues wearing the sandals. I advised her to speak with her podiatrist about modifying her orthotics. She should limit excessive walking. All of her questions were answered. She understands and consents to the plan. This treatment plan was discussed and reviewed with the patient's spouse who agrees with the treatment course. \par \par FU 1 month.\par after a left ankle cortisone injection in the area of the posterior tibial tendon today (06/11/2021). \par \par The patient is an 75 year old female with bone on bone arthritis of their right knee. Based upon the patient's continued symptoms and failure to respond to conservative treatment I have recommended a total knee replacement for this patient. A long discussion took place with the patient describing what a total joint replacement is and what the procedure would entail. A total knee model, similar to the implant that will be used during the operation was utilized to demonstrate and to discuss the various bearing surfaces of the implants. The hospitalization and post-operative care and rehabilitation were also discussed. The use of perioperative antibiotics and DVT prophylaxis were discussed. The risk, benefits and alternatives to a surgical intervention were discussed at length with the patient. The patient was also advised of risks related to the medical comorbidities and elevated body mass index (BMI). A lengthy discussion took place to review the most common complications including but not limited to: deep vein thrombosis, pulmonary embolus, heart attack, stroke, infection, wound breakdown, numbness, damage to nerves, tendon, muscles, arteries or other blood vessels, death and other possible complications from anesthesia. The patient was told that we will take steps to minimize these risks by using sterile technique, antibiotics and DVT prophylaxis when appropriate and follow the patient postoperatively in the office setting to monitor progress. The possibility of recurrent pain, no improvement in pain and actual worsening of pain were also discussed with the patient.\par \par The discharge plan of care focused on the patient going home following surgery. I encouraged the patient to make the necessary arrangements to have someone stay with them when they are discharged home. Following discharge, a home care nurse will visit the patient. They will open your home care case and request home physical therapy services. Home physical therapy will commence following discharge provided it is appropriate and covered by her health insurance benefit plan.\par \par The risks, benefits and alternative treatment options of total joint replacement were reviewed with the patient at great length. All questions were answered to the satisfaction of the patient. The patient participated in an interactive discussion of the total knee replacement implant planned for their surgery with questions answered. The patient agreed with the treatment plan, and has decided to move forward with the elective total knee replacement as planned.\par \par DELPHINE BEAULIEU was seen face to face and needs a commode for bedside use as the patient has no ability to acces the bathroom in their home. The patient also requires a rolling walker as this is needed for activities of daily living within their home secondary to the diagnosis of osteoarthritis.

## 2021-06-11 NOTE — HISTORY OF PRESENT ILLNESS
[de-identified] : 75 year old female presents with right knee pain. She received a Monovisc injection on 1/15/2021 and reports relief. She is still going to PT for her knee and back and feels it is very helpful. She is also complaining of left foot and ankle pain since January. She describes a throbbing and burning pain localized over the posterior tibial tendon. She has been seeing a podiatrist (Dr. Jeramie Mackay). He made her custom orthotics and shaved them down recently. She states that they still cause too much pressure under her feet and she cannot wear them. She has been wearing sandals. She notes that the lateral aspect of her foot has been swollen. She returned to her podiatrist 3 weeks ago and was given a cortisone injection into that area. It has not given her any relief. 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 \par \par MRI of the Left Ankle taken at [facility] on 2/11/2021:\par 1. Flattened appearance of the peroneus brevis at the level of the lateral malleolus, consistent with the presence of a longitudinal split.\par 2. Mild tibialis posterior tendinosis.\par 3. Mild soft tissue edema around the ankle.\par 4. Mild degenerative changes in the midfoot.\par \par Radiology Results taken on 10/2/2020:\par o Right Knee : Standing AP,lateral and tunnel views of the knee were obtained and revealed severe lateral and patellofemoral arthritis.

## 2021-06-11 NOTE — ADDENDUM
[FreeTextEntry1] : I, Von Rivera, acted solely as a scribe for Dr. Fracisco Lopez on this date 06/11/2021.\par All medical record entries made by the Scribe were at my, Dr. Fracisco Lopez, direction and personally dictated by me on 06/11/2021. I have reviewed the chart and agree that the record accurately reflects my personal performance of the history, physical exam, assessment and plan. I have also personally directed, reviewed, and agreed with the chart.

## 2021-07-16 ENCOUNTER — APPOINTMENT (OUTPATIENT)
Dept: ORTHOPEDIC SURGERY | Facility: CLINIC | Age: 76
End: 2021-07-16
Payer: MEDICARE

## 2021-07-16 DIAGNOSIS — M81.0 AGE-RELATED OSTEOPOROSIS W/OUT CURRENT PATHOLOGICAL FRACTURE: ICD-10-CM

## 2021-07-16 PROCEDURE — 99214 OFFICE O/P EST MOD 30 MIN: CPT

## 2021-07-16 PROCEDURE — 73630 X-RAY EXAM OF FOOT: CPT | Mod: LT

## 2021-07-16 RX ORDER — CELECOXIB 200 MG/1
200 CAPSULE ORAL
Qty: 30 | Refills: 3 | Status: ACTIVE | COMMUNITY
Start: 2021-07-16 | End: 1900-01-01

## 2021-07-16 NOTE — HISTORY OF PRESENT ILLNESS
[de-identified] : 75 year old female presents complaining of left foot and ankle pain. She has been seeing a podiatrist (Dr. Jeramie Mackay). He made her custom orthotics but she could not tolerate them. She has been wearing sandals. She is getting new orthotics tomorrow. About 7 weeks ago Dr. Mackay gave her cortisone into the lateral aspect of the foot. It did not give her relief. At her visit on 6/11/21 she received a cortisone injection into the posterior tibial tendon. She is still complaining of pain and a throbbing that wakes her from sleep. She is also complaining of midfoot discomfort. She notes discomfort when her shoe rubs against her foot. She has been wearing sandals. She has been using Voltaren gel and Salonpas. She would like to review her recent DEXA scan. She is not on osteoporosis medications. She notes that she took Boniva years ago but stopped.\par \par DEXA Scan done at [facility] showed osteoporosis. AP Lumbar Spine -2.7\par \par AP, lateral and mortise views of the left ankle taken at 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 \par \par MRI of the Left Ankle taken at [facility] on 2/11/2021:\par 1. Flattened appearance of the peroneus brevis at the level of the lateral malleolus, consistent with the presence of a longitudinal split.\par 2. Mild tibialis posterior tendinosis.\par 3. Mild soft tissue edema around the ankle.\par 4. Mild degenerative changes in the midfoot.

## 2021-07-16 NOTE — PHYSICAL EXAM
[LE] : Sensory: Intact in bilateral lower extremities [de-identified] : Constitutional\par o Appearance : well-nourished, well developed, alert, in no acute distress \par Head and Face\par o Head :\par ¦ Inspection : atraumatic, normocephalic\par o Face :\par ¦ Inspection : no visible rash or discoloration\par Respiratory\par o Respiratory Effort: breathing unlabored \par Neurologic\par o Sensation : Normal sensation \par Psychiatric\par o Mood and Affect: mood normal, affect appropriate \par Lymphatic\par o Additional Nodes : No palpable lymph nodes present \par \par Right Lower Extremity \par o Right Ankle :\par ¦ Inspection/Palpation : no tenderness to palpation, no swelling    \par ¦ Range of Motion : arc of motion full and painless in all planes\par ¦ Stability : no joint instability en provocative testing\par ¦ Strength : all muscles 5/5\par o Skin : no erythema or ecchymosis present\par o Sensation : sensation to pin intact\par o Tests and Signs : Doyle test negative \par \par o Right Foot:\par ¦ Inspection/Palpation : no tenderness to palpation, no swelling\par ¦ Range of Motion : arc of motion full and painless in all planes\par ¦ Stability : no joint instability on provocative testing\par ¦ Strength : all muscles 5/5\par o Skin : no erythema or ecchymosis present \par \par Left Lower Extremity \par o Left Ankle :\par ¦ Inspection/Palpation : posterior tibial tendon tenderness, no swelling    \par ¦ Range of Motion : arc of motion full and painless in all planes\par ¦ Stability : no joint instability en provocative testing\par ¦ Strength : all muscles 5/5\par o Skin : no erythema or ecchymosis present\par o Sensation : sensation to pin intact\par o Tests and Signs : Doyle test negative \par \par o Left Foot:\par ¦ Inspection/Palpation : midfoot tenderness to palpation, no swelling, hammertoes of all toes\par ¦ Range of Motion : arc of motion full and painless in all planes\par ¦ Stability : no joint instability on provocative testing\par ¦ Strength : all muscles 5/5\par o Skin : no erythema or ecchymosis present \par \par Gait and Station:\par Gait: stiff with poor pushoff on the left, has an arch while seated but has a planovalgus deformity while standing, pain with heel and toe walking on the left, no significant extremity swelling or lymphedema, good proprioception and balance, no foot drop, good core strength\par \par Radiology Results\par o Left Foot : AP, lateral and oblique views of the foot were obtained and revealed midfoot arthritis with a plantar spur, no evident fracture.

## 2021-07-16 NOTE — DISCUSSION/SUMMARY
[de-identified] : I discussed the underlying pathophysiology of the patient's condition in great detail with the patient and her spouse. I went over the patient's x-rays and DEXA scan with them in great detail. We discussed the use of ice, Tylenol, anti-inflammatories and Voltaren gel to relieve pain. A prescription for Celebrex was provided. She was instructed on exercises to do for her left foot using a washcloth in a basin of cool water. I discussed ergonomic solutions with the patient such as wearing shoes or sandals with arch supports. I would like to see the patient back in the office in 1 month to reassess her condition.She should bring her new orthotics. I advised her to ask her doctor for a referral to a bone endocrinologist. She was given the name of Dr. Alanna Pressley. We discussed a possible cortisone injection into the midfoot. All of their questions were answered. They understand and consent to the plan. \par \par FU 1 month.\par after taking Celebrex.\par after using her orthotics.\par \par The patient is an 75 year old female with bone on bone arthritis of their right knee. Based upon the patient's continued symptoms and failure to respond to conservative treatment I have recommended a total knee replacement for this patient. A long discussion took place with the patient describing what a total joint replacement is and what the procedure would entail. A total knee model, similar to the implant that will be used during the operation was utilized to demonstrate and to discuss the various bearing surfaces of the implants. The hospitalization and post-operative care and rehabilitation were also discussed. The use of perioperative antibiotics and DVT prophylaxis were discussed. The risk, benefits and alternatives to a surgical intervention were discussed at length with the patient. The patient was also advised of risks related to the medical comorbidities and elevated body mass index (BMI). A lengthy discussion took place to review the most common complications including but not limited to: deep vein thrombosis, pulmonary embolus, heart attack, stroke, infection, wound breakdown, numbness, damage to nerves, tendon, muscles, arteries or other blood vessels, death and other possible complications from anesthesia. The patient was told that we will take steps to minimize these risks by using sterile technique, antibiotics and DVT prophylaxis when appropriate and follow the patient postoperatively in the office setting to monitor progress. The possibility of recurrent pain, no improvement in pain and actual worsening of pain were also discussed with the patient.\par \par The discharge plan of care focused on the patient going home following surgery. I encouraged the patient to make the necessary arrangements to have someone stay with them when they are discharged home. Following discharge, a home care nurse will visit the patient. They will open your home care case and request home physical therapy services. Home physical therapy will commence following discharge provided it is appropriate and covered by her health insurance benefit plan.\par \par The risks, benefits and alternative treatment options of total joint replacement were reviewed with the patient at great length. All questions were answered to the satisfaction of the patient. The patient participated in an interactive discussion of the total knee replacement implant planned for their surgery with questions answered. The patient agreed with the treatment plan, and has decided to move forward with the elective total knee replacement as planned.\par \par DELPHINE BEAULIEU was seen face to face and needs a commode for bedside use as the patient has no ability to acces the bathroom in their home. The patient also requires a rolling walker as this is needed for activities of daily living within their home secondary to the diagnosis of osteoarthritis.

## 2021-07-16 NOTE — ADDENDUM
[FreeTextEntry1] : I, Von Rivera, acted solely as a scribe for Dr. Fracisco Lopez on this date 07/16/2021.\par All medical record entries made by the Scribe were at my, Dr. Fracisco Lopez, direction and personally dictated by me on 07/16/2021. I have reviewed the chart and agree that the record accurately reflects my personal performance of the history, physical exam, assessment and plan. I have also personally directed, reviewed, and agreed with the chart.

## 2021-08-20 ENCOUNTER — APPOINTMENT (OUTPATIENT)
Dept: ORTHOPEDIC SURGERY | Facility: CLINIC | Age: 76
End: 2021-08-20
Payer: MEDICARE

## 2021-08-20 PROCEDURE — 99213 OFFICE O/P EST LOW 20 MIN: CPT

## 2021-08-20 NOTE — DISCUSSION/SUMMARY
[de-identified] : I went over the pathophysiology of the patient's symptoms in great detail with the patient and her spouse. I am recommending the patient continue going to physical therapy to obtain increases in their strength and mobility. A prescription for PT was provided. A prescription for extra depth shoes was provided. All of their questions were answered. They understand and consent to the plan. \par \par FU 6 weeks.\par after continuing PT for her left ankle.\par after shoe wear modifications.\par \par The patient is an 75 year old female with bone on bone arthritis of their right knee. Based upon the patient's continued symptoms and failure to respond to conservative treatment I have recommended a total knee replacement for this patient. A long discussion took place with the patient describing what a total joint replacement is and what the procedure would entail. A total knee model, similar to the implant that will be used during the operation was utilized to demonstrate and to discuss the various bearing surfaces of the implants. The hospitalization and post-operative care and rehabilitation were also discussed. The use of perioperative antibiotics and DVT prophylaxis were discussed. The risk, benefits and alternatives to a surgical intervention were discussed at length with the patient. The patient was also advised of risks related to the medical comorbidities and elevated body mass index (BMI). A lengthy discussion took place to review the most common complications including but not limited to: deep vein thrombosis, pulmonary embolus, heart attack, stroke, infection, wound breakdown, numbness, damage to nerves, tendon, muscles, arteries or other blood vessels, death and other possible complications from anesthesia. The patient was told that we will take steps to minimize these risks by using sterile technique, antibiotics and DVT prophylaxis when appropriate and follow the patient postoperatively in the office setting to monitor progress. The possibility of recurrent pain, no improvement in pain and actual worsening of pain were also discussed with the patient.\par \par The discharge plan of care focused on the patient going home following surgery. I encouraged the patient to make the necessary arrangements to have someone stay with them when they are discharged home. Following discharge, a home care nurse will visit the patient. They will open your home care case and request home physical therapy services. Home physical therapy will commence following discharge provided it is appropriate and covered by her health insurance benefit plan.\par \par The risks, benefits and alternative treatment options of total joint replacement were reviewed with the patient at great length. All questions were answered to the satisfaction of the patient. The patient participated in an interactive discussion of the total knee replacement implant planned for their surgery with questions answered. The patient agreed with the treatment plan, and has decided to move forward with the elective total knee replacement as planned.\par \juvenal BEAULIEU was seen face to face and needs a commode for bedside use as the patient has no ability to acces the bathroom in their home. The patient also requires a rolling walker as this is needed for activities of daily living within their home secondary to the diagnosis of osteoarthritis.

## 2021-08-20 NOTE — ADDENDUM
[FreeTextEntry1] : I, Von Rivera, acted solely as a scribe for Dr. Fracisco Lopez on this date 08/20/2021.\par All medical record entries made by the Scribe were at my, Dr. Fracisco Lopez, direction and personally dictated by me on 08/20/2021. I have reviewed the chart and agree that the record accurately reflects my personal performance of the history, physical exam, assessment and plan. I have also personally directed, reviewed, and agreed with the chart.

## 2021-08-20 NOTE — HISTORY OF PRESENT ILLNESS
[de-identified] : 75 year old female presents complaining of left foot and ankle pain. She has been seeing a podiatrist (Dr. Jeramie Mackay). About 11 weeks ago Dr. Mackay gave her cortisone into the lateral aspect of the foot. It did not give her relief. At her visit on 6/11/21, she received a cortisone injection into the posterior tibial tendon. She is also going to PT. She still complaining of constant pain and throbbing. She also notes midfoot discomfort. She states she wakes from sleep from the pain and has cramping in her toes. She had orthotics made but they only fit in shoes with extra depth. They do not fit in her regular shoes so she is only wearing them at home in her slippers. She notes the orthotics feel very comfortable and she has no pain when she uses them.  \par \par Xrays done 7/16/2021:\par o Left Foot : AP, lateral and oblique views of the foot were obtained and revealed midfoot arthritis with a plantar spur, no evident fracture.\par \par DEXA Scan done at [facility] showed osteoporosis. AP Lumbar Spine -2.7\par \par AP, lateral and mortise views of the left ankle taken at [facility] on 1/28/2021 showed a plantar calcaneal spur, mild degenerative arthritis of the subtalar joint. \par \par MRI of the Left Ankle taken at [facility] on 2/11/2021:\par 1. Flattened appearance of the peroneus brevis at the level of the lateral malleolus, consistent with the presence of a longitudinal split.\par 2. Mild tibialis posterior tendinosis.\par 3. Mild soft tissue edema around the ankle.\par 4. Mild degenerative changes in the midfoot.

## 2021-08-20 NOTE — PHYSICAL EXAM
[LE] : Sensory: Intact in bilateral lower extremities [de-identified] : Constitutional\par o Appearance : well-nourished, well developed, alert, in no acute distress \par Head and Face\par o Head :\par ¦ Inspection : atraumatic, normocephalic\par o Face :\par ¦ Inspection : no visible rash or discoloration\par Respiratory\par o Respiratory Effort: breathing unlabored \par Neurologic\par o Sensation : Normal sensation \par Psychiatric\par o Mood and Affect: mood normal, affect appropriate \par Lymphatic\par o Additional Nodes : No palpable lymph nodes present \par \par Right Lower Extremity \par o Right Ankle :\par ¦ Inspection/Palpation : no tenderness to palpation, no swelling    \par ¦ Range of Motion : arc of motion full and painless in all planes\par ¦ Stability : no joint instability en provocative testing\par ¦ Strength : all muscles 5/5\par o Skin : no erythema or ecchymosis present\par o Sensation : sensation to pin intact\par o Tests and Signs : Doyle test negative \par \par o Right Foot:\par ¦ Inspection/Palpation : no tenderness to palpation, no swelling\par ¦ Range of Motion : arc of motion full and painless in all planes\par ¦ Stability : no joint instability on provocative testing\par ¦ Strength : all muscles 5/5\par o Skin : no erythema or ecchymosis present \par \par Left Lower Extremity \par o Left Ankle :\par ¦ Inspection/Palpation : anterior capsular tenderness, tenderness over the anterior tibialis tendon, no swelling    \par ¦ Range of Motion : FROM, pain with full plantarflexion and inversion\par ¦ Stability : no joint instability en provocative testing\par ¦ Strength : all muscles 5/5\par o Skin : no erythema or ecchymosis present\par o Sensation : sensation to pin intact\par o Tests and Signs : Doyle test negative \par \par o Left Foot:\par ¦ Inspection/Palpation : midfoot tenderness to palpation, no swelling, hammertoes of all toes\par ¦ Range of Motion : arc of motion full and painless in all planes\par ¦ Stability : no joint instability on provocative testing\par ¦ Strength : all muscles 5/5\par o Skin : no erythema or ecchymosis present \par \par Gait and Station:\par Gait: stiff with poor pushoff on the left, has an arch while seated but has a planovalgus deformity while standing, planovalgus deformity is nicely corrected by her orthotics, no significant extremity swelling or lymphedema, good proprioception and balance, no foot drop, good core strength

## 2021-10-22 ENCOUNTER — APPOINTMENT (OUTPATIENT)
Dept: ORTHOPEDIC SURGERY | Facility: CLINIC | Age: 76
End: 2021-10-22
Payer: MEDICARE

## 2021-10-22 DIAGNOSIS — M19.079 PRIMARY OSTEOARTHRITIS, UNSPECIFIED ANKLE AND FOOT: ICD-10-CM

## 2021-10-22 PROCEDURE — 99213 OFFICE O/P EST LOW 20 MIN: CPT

## 2021-10-22 NOTE — PHYSICAL EXAM
[LE] : Sensory: Intact in bilateral lower extremities [de-identified] : Constitutional\par o Appearance : well-nourished, well developed, alert, in no acute distress \par Head and Face\par o Head :\par ¦ Inspection : atraumatic, normocephalic\par o Face :\par ¦ Inspection : no visible rash or discoloration\par Respiratory\par o Respiratory Effort: breathing unlabored \par Neurologic\par o Sensation : Normal sensation \par Psychiatric\par o Mood and Affect: mood normal, affect appropriate \par Lymphatic\par o Additional Nodes : No palpable lymph nodes present \par \par Right Lower Extremity \par o Right Ankle :\par ¦ Inspection/Palpation : no tenderness to palpation, no swelling    \par ¦ Range of Motion : arc of motion full and painless in all planes\par ¦ Stability : no joint instability en provocative testing\par ¦ Strength : all muscles 5/5\par o Skin : no erythema or ecchymosis present\par o Sensation : sensation to pin intact\par o Tests and Signs : Doyle test negative \par \par o Right Foot:\par ¦ Inspection/Palpation : no tenderness to palpation, no swelling\par ¦ Range of Motion : arc of motion full and painless in all planes\par ¦ Stability : no joint instability on provocative testing\par ¦ Strength : all muscles 5/5\par o Skin : no erythema or ecchymosis present \par \par Left Lower Extremity \par o Left Ankle :\par ¦ Inspection/Palpation : tenderness over the posterior tibial tendon, tenderness over the peroneal tendons, no swelling    \par ¦ Range of Motion : FROM, pain with full plantarflexion and inversion\par ¦ Stability : no joint instability en provocative testing\par ¦ Strength : all muscles 5/5\par o Skin : no erythema or ecchymosis present\par o Sensation : sensation to pin intact\par o Tests and Signs : Doyle test negative \par \par o Left Foot:\par ¦ Inspection/Palpation : midfoot tenderness to palpation, no swelling, hammertoes of all toes\par ¦ Range of Motion : arc of motion full and painless in all planes\par ¦ Stability : no joint instability on provocative testing\par ¦ Strength : all muscles 5/5\par o Skin : no erythema or ecchymosis present \par \par Gait and Station:\par Gait: stiff with poor pushoff on the left, has an arch while seated but has a planovalgus deformity while standing, planovalgus deformity is nicely corrected by her orthotics, no significant extremity swelling or lymphedema, good proprioception and balance, no foot drop, good core strength

## 2021-10-22 NOTE — ADDENDUM
[FreeTextEntry1] : I, Von Rivera, acted solely as a scribe for Dr. Fracisco Lopez on this date 10/22/2021.\par All medical record entries made by the Scribe were at my, Dr. Fracisco Lopez, direction and personally dictated by me on 10/22/2021. I have reviewed the chart and agree that the record accurately reflects my personal performance of the history, physical exam, assessment and plan. I have also personally directed, reviewed, and agreed with the chart.

## 2021-10-22 NOTE — DISCUSSION/SUMMARY
[de-identified] : I went over the pathophysiology of the patient's symptoms in great detail with the patient and her . I recommend that the patient utilize toe spacers for her hammertoes. I am recommending the patient continue going to physical therapy to obtain increases in their strength and mobility. A prescription was provided. She will continue wearing the orthotics and exercising with a washcloth in a basin of water. All of their questions were answered. They understand and consent to the plan. \par \par FU 4-6 weeks.\par after continuing PT for her left ankle and foot.\par after wearing her orthotics.

## 2022-01-14 ENCOUNTER — APPOINTMENT (OUTPATIENT)
Dept: ORTHOPEDIC SURGERY | Facility: CLINIC | Age: 77
End: 2022-01-14
Payer: MEDICARE

## 2022-01-14 PROCEDURE — 99214 OFFICE O/P EST MOD 30 MIN: CPT

## 2022-01-14 NOTE — ADDENDUM
[FreeTextEntry1] : I, Von Rivera, acted solely as a scribe for Dr. Fracisco Lopez on this date 01/14/2022.\par All medical record entries made by the Scribe were at my, Dr. Fracisco Lopez, direction and personally dictated by me on 01/14/2022. I have reviewed the chart and agree that the record accurately reflects my personal performance of the history, physical exam, assessment and plan. I have also personally directed, reviewed, and agreed with the chart.

## 2022-01-14 NOTE — DISCUSSION/SUMMARY
[de-identified] : I went over the pathophysiology of the patient's symptoms in great detail with the patient and her . I advised her to wear a spacer between her 4th and 5th toes and brandie tape them. At-home stretching exercises were discussed and demonstrated with the patient. I am recommending the patient continues going to physical therapy to obtain increases in her strength and mobility. A prescription was provided. I informed her that she is due for a right knee Monovisc injection but she wants to wait until her next visit. She will follow-up in 6 weeks. All of their questions were answered. They understand and consent to the plan. \par \par FU 6 weeks.\par after continuing PT for her left foot, right knee and lower back.\par for a right knee Monovisc injection.

## 2022-01-14 NOTE — HISTORY OF PRESENT ILLNESS
[de-identified] : 76 year old female presents with left foot and ankle pain. She is under the care of a podiatrist (Dr. Jeramie Mackay). About 8 months ago, Dr. Mackay gave her cortisone into the lateral aspect of the foot. It did not help. At her visit on 6/11/21, she received a cortisone injection into the posterior tibial tendon. She has continued attending PT and wearing orthotics in extra depth sneakers. She also wears a toe spacer intermittently. She notes her foot is feeling better. Her therapist is working on her left foot, right knee and lower back. Her back has been quiescent. Her right knee has been uncomfortable. She last had Monovisc on 01/15/2021.\par \par Xrays done 7/16/2021:\par o Left Foot : AP, lateral and oblique views of the foot were obtained and revealed midfoot arthritis with a plantar spur, no evident fracture.\par \par DEXA Scan done at [facility] showed osteoporosis. AP Lumbar Spine -2.7\par \par AP, lateral and mortise views of the left ankle taken at [facility] on 1/28/2021 showed a plantar calcaneal spur, mild degenerative arthritis of the subtalar joint. \par \par MRI of the Left Ankle taken at [facility] on 2/11/2021:\par 1. Flattened appearance of the peroneus brevis at the level of the lateral malleolus, consistent with the presence of a longitudinal split.\par 2. Mild tibialis posterior tendinosis.\par 3. Mild soft tissue edema around the ankle.\par 4. Mild degenerative changes in the midfoot.

## 2022-01-14 NOTE — PHYSICAL EXAM
[LE] : Sensory: Intact in bilateral lower extremities [de-identified] : Constitutional\par o Appearance : well-nourished, well developed, alert, in no acute distress \par Head and Face\par o Head :\par ¦ Inspection : atraumatic, normocephalic\par o Face :\par ¦ Inspection : no visible rash or discoloration\par Respiratory\par o Respiratory Effort: breathing unlabored \par Neurologic\par o Sensation : Normal sensation \par Psychiatric\par o Mood and Affect: mood normal, affect appropriate \par Lymphatic\par o Additional Nodes : No palpable lymph nodes present \par \par Right Lower Extremity \par o Right Knee :\par ¦ Inspection/Palpation : medial and lateral compartment tenderness to palpation, no swelling, valgus deformity\par ¦ Range of Motion : FROM, pain with full flexion, no crepitance\par ¦ Stability : no valgus or varus instability present on provocative testing\par ¦ Strength : flexion and extension 4+/5\par ¦ Tests and Signs : negative Anterior Drawer, negative Lachman, negative Jacques \par \par o Right Ankle :\par ¦ Inspection/Palpation : no tenderness to palpation, no swelling    \par ¦ Range of Motion : arc of motion full and painless in all planes\par ¦ Stability : no joint instability en provocative testing\par ¦ Strength : all muscles 5/5\par o Skin : no erythema or ecchymosis present\par o Sensation : sensation to pin intact\par o Tests and Signs : Doyle test negative \par \par o Right Foot:\par ¦ Inspection/Palpation : no tenderness to palpation, no swelling\par ¦ Range of Motion : arc of motion full and painless in all planes\par ¦ Stability : no joint instability on provocative testing\par ¦ Strength : all muscles 5/5\par o Skin : no erythema or ecchymosis present \par \par Left Lower Extremity \par o Left Knee :\par ¦ Inspection/Palpation : no tenderness to palpation, no swelling\par ¦ Range of Motion : active flexion and extension full and painless, no crepitance\par ¦ Stability : no valgus or varus instability present on provocative testing\par ¦ Strength : flexion and extension 5/5\par ¦ Tests and Signs : negative Anterior Drawer, negative Lachman, negative Jacques \par \par o Left Ankle :\par ¦ Inspection/Palpation : tenderness over the posterior tibial tendon, tenderness over the peroneal tendons, no swelling    \par ¦ Range of Motion : FROM, pain with full plantarflexion and inversion\par ¦ Stability : no joint instability en provocative testing\par ¦ Strength : all muscles 5/5\par o Skin : no erythema or ecchymosis present\par o Sensation : sensation to pin intact\par o Tests and Signs : Doyle test negative \par \par o Left Foot:\par ¦ Inspection/Palpation : midfoot tenderness to palpation, no swelling, hammertoes of all toes, crossover of the 4th toe on the 5th toe, cavus deformity\par ¦ Range of Motion : arc of motion full and painless in all planes\par ¦ Stability : no joint instability on provocative testing\par ¦ Strength : all muscles 5/5\par o Skin : no erythema or ecchymosis present \par \par Gait and Station:\par Gait: stiff with better pushoff on the left, has an arch while seated but has a planovalgus deformity while standing, planovalgus deformity is nicely corrected by her orthotics, no significant extremity swelling or lymphedema, good proprioception and balance, no foot drop, good core strength

## 2022-03-17 ENCOUNTER — APPOINTMENT (OUTPATIENT)
Dept: UROGYNECOLOGY | Facility: CLINIC | Age: 77
End: 2022-03-17
Payer: MEDICARE

## 2022-03-17 VITALS
OXYGEN SATURATION: 98 % | WEIGHT: 175 LBS | BODY MASS INDEX: 33.04 KG/M2 | SYSTOLIC BLOOD PRESSURE: 128 MMHG | TEMPERATURE: 97.3 F | DIASTOLIC BLOOD PRESSURE: 78 MMHG | HEART RATE: 62 BPM | HEIGHT: 61 IN

## 2022-03-17 DIAGNOSIS — N95.2 POSTMENOPAUSAL ATROPHIC VAGINITIS: ICD-10-CM

## 2022-03-17 LAB
BILIRUB UR QL STRIP: NORMAL
CLARITY UR: CLEAR
COLLECTION METHOD: NORMAL
GLUCOSE UR-MCNC: NORMAL
HCG UR QL: 0.2 EU/DL
HGB UR QL STRIP.AUTO: NORMAL
KETONES UR-MCNC: NORMAL
LEUKOCYTE ESTERASE UR QL STRIP: NORMAL
NITRITE UR QL STRIP: NORMAL
PH UR STRIP: 7
PROT UR STRIP-MCNC: NORMAL
SP GR UR STRIP: 1.01

## 2022-03-17 PROCEDURE — 51725 SIMPLE CYSTOMETROGRAM: CPT

## 2022-03-17 PROCEDURE — 99204 OFFICE O/P NEW MOD 45 MIN: CPT | Mod: 25

## 2022-03-17 RX ORDER — TROSPIUM CHLORIDE 60 MG/1
60 CAPSULE, EXTENDED RELEASE ORAL
Qty: 90 | Refills: 3 | Status: ACTIVE | COMMUNITY
Start: 2022-03-17 | End: 1900-01-01

## 2022-03-17 NOTE — HISTORY OF PRESENT ILLNESS
[FreeTextEntry1] : This is a 76-year-old woman with history of prolapse surgery in 1999 by Dr. Bashir which failed and was repeated by myself in 2000.  I do not have access to the operative report but by our discussion and memory she had an abdominal sacral suspension.  It is not clear whether she had an antiincontinence procedure at the time.\par Seeing ortho - possible knee replacement needed. \par \par She has had excellent relief from prolapse but has significant mixed urinary incontinence she has leakage with cough sneezing and unprovoked and following urgency.\par \par She takes hydrochlorothiazide Benicar labetalol and Crestor\par \par Comorbidities include back problems cholesterol and blood pressure.\par \par Please see handwritten detailed physical exam.  For the purposes of her chief complaints the findings on exams include:\par \par Normal general physical examination.  Atrophic changes of the vagina and vulva.\par There is a moderate to large rectocele that reaches the introitus but is asymptomatic.  The anterior vaginal wall as better supported with the mid vaginal wall at -2 cm to the introitus..\par With straining and there is involuntary leakage of urine prior to filling.\par The posterior vaginal wall and apex prolapse 2-6 the anterior vaginal wall has much better support.\par \par \par TRANS-URETHRAL BLADDER CATHERIZATION: Due to symptoms of hesitancy, to rule out UTI, and to rule our retention, sterile prep and bladder catherization was performed.\par Post Void Residual volume was  300  cc.\par \par Urine analysis demonstrated  negative\par \par DIAGNOSTIC PROCEDURE: SIMPLE CYSTOMETRY :\par  -diagnostic test indicated as general examination, history, urine analysis and  microscopy failed to explain patient complaints\par \par In the supine position, the urethra was prepped with Betadine. A 16 Scottish catheter was gently passed into the bladder with sterile technique and secured in place.\par A urine sample was obtained via catheterization. Sterile water was infused by gravity via an irrigation syringe.\par Patient sensation, change in flow rate, and capacity were observed: \par \par First Sensation       30   (cc)      First Urgency  200      Increase Urge    350     Capacity       400 \par Pain with Fill:  NEGATIVE   \par Sensory Urgency:       negative  \par Post Fill Void     450(cc):                         \par \par Uroflow: no print    \par \par Involuntary detrusor contractions:  equivocal\par \par Cough Stress Test : positive    \par \par Diagnoses: \par \par  overactive bladder  clinical - not demonstrated\par  stress incontinence demonstrated\par \par \par \par Discussed mixed incontinence\par Largest amount of leakage unprovoked although I did not appreciate overactive bladder on simple filling today.\par \par \par \par \par TREATMENT PLAN\par \par OVERACTIVE BLADDER SYMPTOMS\par Vitamin C 1000 mg daily\par Cranberry tablets 400mg twice a day. \par Decrease EXTREME  coffee HABIT (6 CUPS A DAY), alcohol, diet sweetener, citrus, spicy foods - these all stimulate voiding\par Decrease over-hydration:  If urine is clear (no yellow color) you are overhydrated\par \par NEW Prescription:   Sanctura (Trospium) one tablet daily - start taking with a stool softener to avoid constipation .  If effective and stool soft can try without stool softener after 1-2 weeks. \par \par stop sanctura 5 days prior to uds\par \par UDS: WOULD LIKE TO OBJECTIVELY ASSESS and treat oab prior to considering sling (stress inc demoonstrated but clinically more minor issue)\par \par Consider estrogen crm. \par \par

## 2022-03-25 ENCOUNTER — APPOINTMENT (OUTPATIENT)
Dept: ORTHOPEDIC SURGERY | Facility: CLINIC | Age: 77
End: 2022-03-25

## 2022-04-01 ENCOUNTER — APPOINTMENT (OUTPATIENT)
Dept: ORTHOPEDIC SURGERY | Facility: CLINIC | Age: 77
End: 2022-04-01
Payer: MEDICARE

## 2022-04-01 DIAGNOSIS — M20.42 OTHER HAMMER TOE(S) (ACQUIRED), RIGHT FOOT: ICD-10-CM

## 2022-04-01 DIAGNOSIS — M76.72 PERONEAL TENDINITIS, LEFT LEG: ICD-10-CM

## 2022-04-01 DIAGNOSIS — M20.41 OTHER HAMMER TOE(S) (ACQUIRED), RIGHT FOOT: ICD-10-CM

## 2022-04-01 DIAGNOSIS — M76.822 POSTERIOR TIBIAL TENDINITIS, LEFT LEG: ICD-10-CM

## 2022-04-01 PROCEDURE — 99213 OFFICE O/P EST LOW 20 MIN: CPT

## 2022-04-01 NOTE — DISCUSSION/SUMMARY
[de-identified] : I went over the pathophysiology of the patient's symptoms in great detail with the patient and her . I recommend she has a 0.25 inch heel lift, in addition to an arch support, in her orthotics to take pressure off of her Achilles tendon. She should bring her orthotics next visit if she has them. I am recommending the patient continues going to physical therapy to obtain increases in her strength and mobility. A prescription was provided. We will hold off on a right knee Monovisc injection today. She will follow-up in 6 weeks and if her knee is not better we will give her a Monovisc injection. All of their questions were answered. They understand and consent to the plan. \par \par FU 6 weeks.\par after continuing PT for the left foot.\par after getting new orthotics.\par for a possible right knee Monovisc injection.

## 2022-04-01 NOTE — PHYSICAL EXAM
[LE] : Sensory: Intact in bilateral lower extremities [de-identified] : Constitutional\par o Appearance : well-nourished, well developed, alert, in no acute distress \par Head and Face\par o Head :\par ¦ Inspection : atraumatic, normocephalic\par o Face :\par ¦ Inspection : no visible rash or discoloration\par Respiratory\par o Respiratory Effort: breathing unlabored \par Neurologic\par o Sensation : Normal sensation \par Psychiatric\par o Mood and Affect: mood normal, affect appropriate \par Lymphatic\par o Additional Nodes : No palpable lymph nodes present \par \par Right Lower Extremity \par o Right Knee :\par ¦ Inspection/Palpation : medial and lateral compartment tenderness to palpation, no swelling, valgus deformity\par ¦ Range of Motion : FROM, pain with full flexion, no crepitance\par ¦ Stability : no valgus or varus instability present on provocative testing\par ¦ Strength : flexion and extension 4+/5\par ¦ Tests and Signs : negative Anterior Drawer, negative Lachman, negative Jacques \par \par o Right Ankle :\par ¦ Inspection/Palpation : no tenderness to palpation, no swelling    \par ¦ Range of Motion : arc of motion full and painless in all planes\par ¦ Stability : no joint instability en provocative testing\par ¦ Strength : all muscles 5/5\par o Skin : no erythema or ecchymosis present\par o Sensation : sensation to pin intact\par o Tests and Signs : Doyle test negative \par \par o Right Foot:\par ¦ Inspection/Palpation : no tenderness to palpation, no swelling\par ¦ Range of Motion : arc of motion full and painless in all planes\par ¦ Stability : no joint instability on provocative testing\par ¦ Strength : all muscles 5/5\par o Skin : no erythema or ecchymosis present \par \par Left Lower Extremity \par o Left Knee :\par ¦ Inspection/Palpation : no tenderness to palpation, no swelling\par ¦ Range of Motion : active flexion and extension full and painless, no crepitance\par ¦ Stability : no valgus or varus instability present on provocative testing\par ¦ Strength : flexion and extension 5/5\par ¦ Tests and Signs : negative Anterior Drawer, negative Lachman, negative Jacques \par \par o Left Ankle :\par ¦ Inspection/Palpation : mild Achilles tendon and posterior tibial tendon tenderness, no swelling    \par ¦ Range of Motion : FROM, pain with full plantarflexion and inversion\par ¦ Stability : no joint instability en provocative testing\par ¦ Strength : all muscles 4+/5\par o Skin : no erythema or ecchymosis present\par o Sensation : sensation to pin intact\par o Tests and Signs : Doyle test negative \par \par o Left Foot:\par ¦ Inspection/Palpation : mild midfoot tenderness to palpation, no metatarsal head tenderness, no swelling, hammertoes of all toes, crossover of the 4th toe on the 5th toe, cavus deformity\par ¦ Range of Motion : arc of motion full and painless in all planes\par ¦ Stability : no joint instability on provocative testing\par ¦ Strength : all muscles 4+/5\par o Skin : no erythema or ecchymosis present \par \par Gait and Station:\par Gait: stiff with better pushoff on the left, has an arch while seated but has a planovalgus deformity while standing, planovalgus deformity is nicely corrected by her orthotics, no significant extremity swelling or lymphedema, good proprioception and balance, no foot drop, good core strength

## 2022-04-01 NOTE — HISTORY OF PRESENT ILLNESS
[de-identified] : 76 year old female presents with left foot and ankle pain. She is under the care of a podiatrist (Dr. Jeramie Mackay). About 10.5 months ago, Dr. Mackay gave her cortisone into the lateral aspect of the foot. It did not help. At her visit on 6/11/21, she received a cortisone injection into the posterior tibial tendon. She is attending PT primarily for her foot and is seeing progress. She also bought new extra depth sneakers and is getting new orthotics. Her back has been quiescent. Her right knee has been intermittently uncomfortable. She last had Monovisc on 01/15/2021.\par \par Xrays done 7/16/2021:\par o Left Foot : AP, lateral and oblique views of the foot were obtained and revealed midfoot arthritis with a plantar spur, no evident fracture.\par \par DEXA Scan done at [facility] showed osteoporosis. 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 \par \par MRI of the Left Ankle taken at [facility] on 2/11/2021:\par 1. Flattened appearance of the peroneus brevis at the level of the lateral malleolus, consistent with the presence of a longitudinal split.\par 2. Mild tibialis posterior tendinosis.\par 3. Mild soft tissue edema around the ankle.\par 4. Mild degenerative changes in the midfoot.

## 2022-04-01 NOTE — REASON FOR VISIT
[Follow-Up Visit] : a follow-up visit for [Knee Pain] : knee pain [Spouse] : spouse [FreeTextEntry2] : left foot pain

## 2022-04-04 ENCOUNTER — APPOINTMENT (OUTPATIENT)
Dept: UROGYNECOLOGY | Facility: CLINIC | Age: 77
End: 2022-04-04
Payer: MEDICARE

## 2022-04-04 PROCEDURE — 51741 ELECTRO-UROFLOWMETRY FIRST: CPT

## 2022-04-04 PROCEDURE — 51797 INTRAABDOMINAL PRESSURE TEST: CPT

## 2022-04-04 PROCEDURE — 51729 CYSTOMETROGRAM W/VP&UP: CPT

## 2022-04-04 PROCEDURE — 51784 ANAL/URINARY MUSCLE STUDY: CPT

## 2022-04-14 ENCOUNTER — APPOINTMENT (OUTPATIENT)
Dept: UROGYNECOLOGY | Facility: CLINIC | Age: 77
End: 2022-04-14
Payer: MEDICARE

## 2022-04-14 VITALS
SYSTOLIC BLOOD PRESSURE: 140 MMHG | HEART RATE: 63 BPM | OXYGEN SATURATION: 97 % | HEIGHT: 61 IN | WEIGHT: 175 LBS | TEMPERATURE: 97.7 F | BODY MASS INDEX: 33.04 KG/M2 | DIASTOLIC BLOOD PRESSURE: 80 MMHG

## 2022-04-14 DIAGNOSIS — R35.0 FREQUENCY OF MICTURITION: ICD-10-CM

## 2022-04-14 DIAGNOSIS — R39.11 HESITANCY OF MICTURITION: ICD-10-CM

## 2022-04-14 PROCEDURE — 99214 OFFICE O/P EST MOD 30 MIN: CPT

## 2022-04-14 RX ORDER — MIRABEGRON 50 MG/1
50 TABLET, FILM COATED, EXTENDED RELEASE ORAL
Qty: 90 | Refills: 0 | Status: ACTIVE | COMMUNITY
Start: 2022-04-14 | End: 1900-01-01

## 2022-04-14 NOTE — HISTORY OF PRESENT ILLNESS
[FreeTextEntry1] : This is a 76-year-old woman who had pelvic prolapse surgery 1990 and 2000 including abdominal sacral suspension.  She was seen in March for mixed urinary incontinence she had contributors to her incontinence including diuretic significant overhydration and caffeine habit.  Urodynamics is confirmed stress incontinence.\par \par \par Dietary modifications and Sanctura restarted in March.  Together they were very effective but the Sanctura was not tolerated due to severe dizziness and severe constipation .  She still has moderate improvement with the reduction in fluids and caffeine habits which are excessive.  Residual symptoms are still bothersome and are mixed stress and urge symptoms. \par \par \par She requested additional treatment.  I recommended Myrbetriq 50 mg 1 tablet daily.  She has high blood pressure but is well controlled and she has a blood pressure cuff at home.  Is willing to do self blood pressure checks in the first 10 days on the medication and given her parameters for which to stop the medication.  She will also swap no-doze for her high volume coffee to reduce acidity and fluid volume which comes with her coffee\par \par Follow-up in 4 to 5 weeks via telehealth.  If incontinence persists despite the above modifications and consideration may be that a sling would be needed.  Urodynamics clearly demonstrated stress incontinence.\par \par New prescription Myrbetriq 50 mg 1 tablet daily.  Risk benefits adverse reactions were reviewed\par Review of previous notes, labs, current prescriptions was performed.\par History , Physical counseling was performed. \par All questions answered in lay language\par Total time for encounter was  46     min\par A chaperone was present for the entirety of the encounter\par \par

## 2022-05-09 ENCOUNTER — APPOINTMENT (OUTPATIENT)
Dept: ORTHOPEDIC SURGERY | Facility: CLINIC | Age: 77
End: 2022-05-09
Payer: MEDICARE

## 2022-05-09 PROCEDURE — 73564 X-RAY EXAM KNEE 4 OR MORE: CPT | Mod: 50

## 2022-05-09 PROCEDURE — 99214 OFFICE O/P EST MOD 30 MIN: CPT | Mod: 25

## 2022-05-09 PROCEDURE — 20610 DRAIN/INJ JOINT/BURSA W/O US: CPT | Mod: RT

## 2022-05-09 NOTE — ADDENDUM
[FreeTextEntry1] : I, Von Rivera, acted solely as a scribe for Dr. Fracisco Lopez on this date 05/09/2022.\par All medical record entries made by the Scribe were at my, Dr. Fracisco Lopez, direction and personally dictated by me on 05/09/2022. I have reviewed the chart and agree that the record accurately reflects my personal performance of the history, physical exam, assessment and plan. I have also personally directed, reviewed, and agreed with the chart.

## 2022-05-09 NOTE — HISTORY OF PRESENT ILLNESS
[de-identified] : 76 year old female presents complaining of bilateral knee pain, right worse than left today. She denies an injury. She notes that last week the left knee was horrible, so she rested for 3 days and didn't walk. It got better and now the right knee is worse. She states that she could not sit and bend her knees due to pain. She wants to discuss a gel shot. \par She got new orthotics 2 days ago and is wearing them in her extra depth shoes. She notes ankle pain over the posterior tibial tendon. She is under the care of a podiatrist (Dr. Jeramie Mackay). About a year ago, Dr. Mackay gave her cortisone into the lateral aspect of the foot. It did not help. At her visit on 6/11/21, she received a cortisone injection into the posterior tibial tendon.\par \par Xrays done 7/16/2021:\par o Left Foot : AP, lateral and oblique views of the foot were obtained and revealed midfoot arthritis with a plantar spur, no evident fracture.\par \par DEXA Scan done at [facility] showed osteoporosis. 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 \par \par MRI of the Left Ankle taken at [facility] on 2/11/2021:\par 1. Flattened appearance of the peroneus brevis at the level of the lateral malleolus, consistent with the presence of a longitudinal split.\par 2. Mild tibialis posterior tendinosis.\par 3. Mild soft tissue edema around the ankle.\par 4. Mild degenerative changes in the midfoot.

## 2022-05-09 NOTE — DISCUSSION/SUMMARY
[de-identified] : I discussed the underlying pathophysiology of the patient's condition in great detail with the patient and her . I went over the patient's x-rays with them in great detail. I informed her that injections such as cortisone and viscosupplementation are short term solutions, and that knee replacement will be required to provide her with long term relief. She was advised to consider surgery towards the end of this year while she is younger and healthier. She will think about surgery after her trip to [place] in August. We are giving her a cortisone injection today, and she will follow-up in June as scheduled to discuss gel shots.\par The patient elected to receive a cortisone injection into her right knee today and tolerated it well. I instructed the patient on ROM exercises and told them to take it easy. The use of ice and rest was reviewed with the patient. The patient may resume activities tomorrow. A discussion ensued about the 2nd COVID booster. All of her questions were answered. She understands and consents to the plan.\par \par FU in June as scheduled to discuss gel shots.\par after a right knee cortisone injection today (05/09/2022).\par \par The patient is an 76 year old female with bone on bone arthritis of their right knee. Based upon the patient's continued symptoms and failure to respond to conservative treatment I have recommended a total knee replacement for this patient. A long discussion took place with the patient describing what a total joint replacement is and what the procedure would entail. A total knee model, similar to the implant that will be used during the operation was utilized to demonstrate and to discuss the various bearing surfaces of the implants. The hospitalization and post-operative care and rehabilitation were also discussed. The use of perioperative antibiotics and DVT prophylaxis were discussed. The risk, benefits and alternatives to a surgical intervention were discussed at length with the patient. The patient was also advised of risks related to the medical comorbidities and elevated body mass index (BMI). A lengthy discussion took place to review the most common complications including but not limited to: deep vein thrombosis, pulmonary embolus, heart attack, stroke, infection, wound breakdown, numbness, damage to nerves, tendon, muscles, arteries or other blood vessels, death and other possible complications from anesthesia. The patient was told that we will take steps to minimize these risks by using sterile technique, antibiotics and DVT prophylaxis when appropriate and follow the patient postoperatively in the office setting to monitor progress. The possibility of recurrent pain, no improvement in pain and actual worsening of pain were also discussed with the patient.\par \par The discharge plan of care focused on the patient going home following surgery. I encouraged the patient to make the necessary arrangements to have someone stay with them when they are discharged home. Following discharge, a home care nurse will visit the patient. They will open your home care case and request home physical therapy services. Home physical therapy will commence following discharge provided it is appropriate and covered by her health insurance benefit plan.\par \par The risks, benefits and alternative treatment options of total joint replacement were reviewed with the patient at great length. All questions were answered to the satisfaction of the patient. The patient participated in an interactive discussion of the total knee replacement implant planned for their surgery with questions answered. The patient agreed with the treatment plan, and has decided to move forward with the elective total knee replacement as planned.\par \par DELPHINE BAEULIEU was seen face to face and needs a commode for bedside use as the patient has no ability to acces the bathroom in their home. The patient also requires a rolling walker as this is needed for activities of daily living within their home secondary to the diagnosis of osteoarthritis.

## 2022-05-09 NOTE — PHYSICAL EXAM
[LE] : Sensory: Intact in bilateral lower extremities [de-identified] : Constitutional\par o Appearance : well-nourished, well developed, alert, in no acute distress \par Head and Face\par o Head :\par ¦ Inspection : atraumatic, normocephalic\par o Face :\par ¦ Inspection : no visible rash or discoloration\par Respiratory\par o Respiratory Effort: breathing unlabored \par Neurologic\par o Sensation : Normal sensation \par Psychiatric\par o Mood and Affect: mood normal, affect appropriate \par Lymphatic\par o Additional Nodes : No palpable lymph nodes present \par \par Right Lower Extremity\par o Buttock : no tenderness, swelling or deformities\par o Right Hip :\par ¦ Inspection/Palpation : no tenderness, no swelling or deformities\par ¦ Range of Motion : full and painless in all planes, no crepitance\par ¦ Stability : joint stability intact\par ¦ Strength : extension, flexion, adduction, abduction, internal rotation and external rotation, 5/5\par ¦ Tests: Jeffry’s test negative \par \par o Right Knee :\par ¦ Inspection/Palpation : medial and lateral compartment tenderness to palpation, no swelling, mild valgus deformity\par ¦ Range of Motion : 0-110°, pain with flexion past 110° , no crepitance, good patellofemoral glide \par ¦ Stability : no valgus or varus instability present on provocative testing\par ¦ Strength : flexion and extension 5/5\par ¦ Tests and Signs : negative Anterior Drawer, negative Lachman, negative Jacques \par \par o Right Ankle :\par ¦ Inspection/Palpation : no tenderness to palpation, no swelling    \par ¦ Range of Motion : arc of motion full and painless in all planes\par ¦ Stability : no joint instability en provocative testing\par ¦ Strength : all muscles 5/5\par o Skin : no erythema or ecchymosis present\par o Sensation : sensation to pin intact\par o Tests and Signs : Doyle test negative \par \par o Right Foot:\par ¦ Inspection/Palpation : no tenderness to palpation, no swelling\par ¦ Range of Motion : arc of motion full and painless in all planes\par ¦ Stability : no joint instability on provocative testing\par ¦ Strength : all muscles 5/5\par o Skin : no erythema or ecchymosis present \par \par Left Lower Extremity\par o Buttock : no tenderness, swelling or deformities \par o Left Hip :\par ¦ Inspection/Palpation : no tenderness, no swelling or deformities\par ¦ Range of Motion : full and painless in all planes, no crepitance\par ¦ Stability : joint stability intact\par ¦ Strength : extension, flexion, adduction, abduction, internal rotation and external rotation, 5/5\par ¦ Tests: Jeffry’s test negative \par \par o Left Knee :\par ¦ Inspection/Palpation : no tenderness to palpation, no swelling\par ¦ Range of Motion : 0-120° , no crepitance\par ¦ Stability : no valgus or varus instability present on provocative testing\par ¦ Strength : flexion and extension 5/5\par ¦ Tests and Signs : negative Anterior Drawer, negative Lachman, negative Jacques \par \par o Left Ankle :\par ¦ Inspection/Palpation : posterior tibial tendon tenderness, no swelling    \par ¦ Range of Motion : FROM, pain with full plantarflexion and inversion\par ¦ Stability : no joint instability en provocative testing\par ¦ Strength : all muscles 4+/5\par o Skin : no erythema or ecchymosis present\par o Sensation : sensation to pin intact\par o Tests and Signs : Doyle test negative \par \par o Left Foot:\par ¦ Inspection/Palpation : mild midfoot tenderness to palpation, no metatarsal head tenderness, no swelling, hammertoes of all toes, crossover of the 4th toe on the 5th toe, cavus deformity\par ¦ Range of Motion : arc of motion full and painless in all planes\par ¦ Stability : no joint instability on provocative testing\par ¦ Strength : all muscles 4+/5\par o Skin : no erythema or ecchymosis present \par \par Gait and Station:\par Gait: stiff with better pushoff on the left, has an arch while seated but has a planovalgus deformity while standing, planovalgus deformity is nicely corrected by her orthotics, no significant extremity swelling or lymphedema, good proprioception and balance, no foot drop, good core strength\par \par Radiology Results:\par o Right Knee : Standing AP, lateral, tunnel, and skyline views of the knee were obtained and reveal severe lateral and patellofemoral arthritis with bone on bone in the lateral compartment. \par o Left Knee : Standing AP, lateral, tunnel, and skyline views of the knee were obtained and reveal severe lateral and patellofemoral arthritis. \par \par o Right Knee injection : Indication- knee osteoarthritis, Anatomic location- right intra-articular joint space, Spray - area was sterilized with Betadine and alcohol and anesthetized with Ethyl Chloride , needle used-20G, Medications given- 5cc's lidocaine, 0.5cc's kenalog, 0.5 cc's dexamethasone, and patient tolerated it well.

## 2022-05-26 ENCOUNTER — APPOINTMENT (OUTPATIENT)
Dept: UROGYNECOLOGY | Facility: CLINIC | Age: 77
End: 2022-05-26
Payer: MEDICARE

## 2022-05-26 DIAGNOSIS — N39.46 MIXED INCONTINENCE: ICD-10-CM

## 2022-05-26 PROCEDURE — 99213 OFFICE O/P EST LOW 20 MIN: CPT | Mod: 95

## 2022-05-26 NOTE — HISTORY OF PRESENT ILLNESS
[FreeTextEntry1] : 76 year old with pelv prolapse surgery 1990, 2000 including abd sacral suspension.  Seen in March for mixed incontinence exacerbated or caused by diuretic,  overhydration and caffeine.\par \par UDS confirmed stress incontinence\par \par In March dietary mod and Sanctura were effective with Sanctura DISCONTINUED FOR CONSTIP AND DIZZINESS\par \par AS OF APRIL still had mod improvement on fluid reduction and caffeine reduction. \par \par In April I began myrbetriq and suggested using no-doze to replace high volume caffeine habit\par \par Also had to stop Myrbetriq due to claritin and myrbetriq causing elevated blood pressure.\par \par Her worst symptoms are leak with effort.  She is traveling in summer.  I recommended we assess anatomy, observe stress leak in office and have in person formal consult for consideration of surgery. Likely refill for simple cmg and consider bladder as well. \par \par She will start pelvic floor pt in [place] and return for formal eval. \par \par Review of previous notes, labs, current prescriptions was performed.\par History , Physical counseling was performed. \par All questions answered in lay language\par Total time for encounter was   33    min\par A chaperone was present for the entirety of the encounter\par \par

## 2022-06-10 ENCOUNTER — APPOINTMENT (OUTPATIENT)
Dept: ORTHOPEDIC SURGERY | Facility: CLINIC | Age: 77
End: 2022-06-10
Payer: MEDICARE

## 2022-06-10 PROCEDURE — 20611 DRAIN/INJ JOINT/BURSA W/US: CPT | Mod: RT

## 2022-06-10 PROCEDURE — 99214 OFFICE O/P EST MOD 30 MIN: CPT | Mod: 25

## 2022-06-10 NOTE — DISCUSSION/SUMMARY
[de-identified] : I discussed the underlying pathophysiology of the patient's condition in great detail with the patient and her . A discussion ensued about total knee replacement. We had a lengthy discussion about the patient's issues, and talked about the benefits and risks of the procedure. The proper pre and post operative procedures and expectations were discussed in extensive detail with the patient. \par The patient elected to receive a Monovisc injection into her right knee today and tolerated it well. I instructed the patient on ROM exercises and told them to take it easy. The use of ice and rest was reviewed with the patient. The patient may resume activities tomorrow. I reminded the patient that it takes 4 to 6 weeks after the injection to feel symptom relief. All of her questions were answered. She understands and consents to the plan.\par \par FU in 6 weeks.\par after a right knee Monovisc injection today (06/10/2022). \par \par The patient is an 76 year old female with bone on bone arthritis of their right knee. Based upon the patient's continued symptoms and failure to respond to conservative treatment I have recommended a total knee replacement for this patient. A long discussion took place with the patient describing what a total joint replacement is and what the procedure would entail. A total knee model, similar to the implant that will be used during the operation was utilized to demonstrate and to discuss the various bearing surfaces of the implants. The hospitalization and post-operative care and rehabilitation were also discussed. The use of perioperative antibiotics and DVT prophylaxis were discussed. The risk, benefits and alternatives to a surgical intervention were discussed at length with the patient. The patient was also advised of risks related to the medical comorbidities and elevated body mass index (BMI). A lengthy discussion took place to review the most common complications including but not limited to: deep vein thrombosis, pulmonary embolus, heart attack, stroke, infection, wound breakdown, numbness, damage to nerves, tendon, muscles, arteries or other blood vessels, death and other possible complications from anesthesia. The patient was told that we will take steps to minimize these risks by using sterile technique, antibiotics and DVT prophylaxis when appropriate and follow the patient postoperatively in the office setting to monitor progress. The possibility of recurrent pain, no improvement in pain and actual worsening of pain were also discussed with the patient.\par \par The discharge plan of care focused on the patient going home following surgery. I encouraged the patient to make the necessary arrangements to have someone stay with them when they are discharged home. Following discharge, a home care nurse will visit the patient. They will open your home care case and request home physical therapy services. Home physical therapy will commence following discharge provided it is appropriate and covered by her health insurance benefit plan.\par \par The risks, benefits and alternative treatment options of total joint replacement were reviewed with the patient at great length. All questions were answered to the satisfaction of the patient. The patient participated in an interactive discussion of the total knee replacement implant planned for their surgery with questions answered. The patient agreed with the treatment plan, and has decided to move forward with the elective total knee replacement as planned.\par \par DELPHINE BEAULIEU was seen face to face and needs a commode for bedside use as the patient has no ability to acces the bathroom in their home. The patient also requires a rolling walker as this is needed for activities of daily living within their home secondary to the diagnosis of osteoarthritis.

## 2022-06-10 NOTE — HISTORY OF PRESENT ILLNESS
[de-identified] : 76 year old female presents complaining of R>L knee pain that flared up 4-6 weeks ago. She states that she could not sit and bend her knees due to pain. She received a right knee cortisone injection on 5/9/2022 and thinks it helped. Her knee feels at least 60% better. She is left with a 4/10 pain in the right knee. She notes no swelling or buckling. She presents today to discuss gel shots. She is scheduled for a right TKR on 10/11/2022, after her trip to [place] in August. \par \par Radiology Results taken on 5/9/2022:\par o Right Knee : Standing AP, lateral, tunnel, and skyline views of the knee were obtained and reveal severe lateral and patellofemoral arthritis with bone on bone in the lateral compartment. No malalignment of the leg.\par o Left Knee : Standing AP, lateral, tunnel, and skyline views of the knee were obtained and reveal severe lateral and patellofemoral arthritis.

## 2022-06-10 NOTE — PHYSICAL EXAM
[LE] : Sensory: Intact in bilateral lower extremities [de-identified] : Constitutional\par o Appearance : well-nourished, well developed, alert, in no acute distress \par Head and Face\par o Head :\par ¦ Inspection : atraumatic, normocephalic\par o Face :\par ¦ Inspection : no visible rash or discoloration\par Respiratory\par o Respiratory Effort: breathing unlabored \par Neurologic\par o Sensation : Normal sensation \par Psychiatric\par o Mood and Affect: mood normal, affect appropriate \par Lymphatic\par o Additional Nodes : No palpable lymph nodes present \par \par Right Lower Extremity\par o Buttock : no tenderness, swelling or deformities\par o Right Hip :\par ¦ Inspection/Palpation : no tenderness, no swelling or deformities\par ¦ Range of Motion : full and painless in all planes, no crepitance\par ¦ Stability : joint stability intact\par ¦ Strength : extension, flexion, adduction, abduction, internal rotation and external rotation, 5/5\par ¦ Tests: Jeffry’s test negative \par \par o Right Knee :\par ¦ Inspection/Palpation : medial and lateral compartment tenderness to palpation, no swelling, valgus deformity\par ¦ Range of Motion : 0-110°, discomfort with flexion past 110° , no crepitance, good patellofemoral glide \par ¦ Stability : no valgus or varus instability present on provocative testing\par ¦ Strength : flexion and extension 5/5\par ¦ Tests and Signs : negative Anterior Drawer, negative Lachman, negative Jacques \par \par o Right Ankle :\par ¦ Inspection/Palpation : no tenderness to palpation, no swelling    \par ¦ Range of Motion : arc of motion full and painless in all planes\par ¦ Stability : no joint instability en provocative testing\par ¦ Strength : all muscles 5/5\par o Skin : no erythema or ecchymosis present\par o Sensation : sensation to pin intact\par o Tests and Signs : Doyle test negative \par \par o Right Foot:\par ¦ Inspection/Palpation : no tenderness to palpation, no swelling\par ¦ Range of Motion : arc of motion full and painless in all planes\par ¦ Stability : no joint instability on provocative testing\par ¦ Strength : all muscles 5/5\par o Skin : no erythema or ecchymosis present \par \par Left Lower Extremity\par o Buttock : no tenderness, swelling or deformities \par o Left Hip :\par ¦ Inspection/Palpation : no tenderness, no swelling or deformities\par ¦ Range of Motion : full and painless in all planes, no crepitance\par ¦ Stability : joint stability intact\par ¦ Strength : extension, flexion, adduction, abduction, internal rotation and external rotation, 5/5\par ¦ Tests: Jeffry’s test negative \par \par o Left Knee :\par ¦ Inspection/Palpation : no tenderness to palpation, no swelling\par ¦ Range of Motion : 0-120° , no crepitance\par ¦ Stability : no valgus or varus instability present on provocative testing\par ¦ Strength : flexion and extension 5/5\par ¦ Tests and Signs : negative Anterior Drawer, negative Lachman, negative Jacques \par \par o Left Ankle :\par ¦ Inspection/Palpation : posterior tibial tendon tenderness, no swelling    \par ¦ Range of Motion : FROM, pain with full plantarflexion and inversion\par ¦ Stability : no joint instability en provocative testing\par ¦ Strength : all muscles 4+/5\par o Skin : no erythema or ecchymosis present\par o Sensation : sensation to pin intact\par o Tests and Signs : Doyle test negative \par \par o Left Foot:\par ¦ Inspection/Palpation : mild midfoot tenderness to palpation, no metatarsal head tenderness, no swelling, hammertoes of all toes, crossover of the 4th toe on the 5th toe, cavus deformity\par ¦ Range of Motion : arc of motion full and painless in all planes\par ¦ Stability : no joint instability on provocative testing\par ¦ Strength : all muscles 4+/5\par o Skin : no erythema or ecchymosis present \par \par Gait and Station:\par Gait: stiff with better pushoff on the left, has an arch while seated but has a planovalgus deformity while standing, planovalgus deformity is nicely corrected by her orthotics, no significant extremity swelling or lymphedema, good proprioception and balance, no foot drop, good core strength\par \par o Right Knee injection : Indication- knee osteoarthritis, Anatomic location- right intra-articular joint space, Spray - area was sterilized with Betadine and alcohol and anesthetized with Ethyl Chloride, needle used-20G, Medications given- 4cc's Monovisc under Ultrasound guidance, and patient tolerated it well.  oLot# [id number]   oExp: 2024-04-30

## 2022-06-10 NOTE — ADDENDUM
[FreeTextEntry1] : I, Von Rivera, acted solely as a scribe for Dr. Fracisco Lopez on this date 06/10/2022.\par All medical record entries made by the Scribe were at my, Dr. Fracisco Lopez, direction and personally dictated by me on 06/10/2022. I have reviewed the chart and agree that the record accurately reflects my personal performance of the history, physical exam, assessment and plan. I have also personally directed, reviewed, and agreed with the chart.

## 2022-07-29 ENCOUNTER — APPOINTMENT (OUTPATIENT)
Dept: ORTHOPEDIC SURGERY | Facility: CLINIC | Age: 77
End: 2022-07-29

## 2022-07-29 PROCEDURE — 99214 OFFICE O/P EST MOD 30 MIN: CPT

## 2022-07-29 NOTE — PHYSICAL EXAM
[LE] : Sensory: Intact in bilateral lower extremities [de-identified] : Constitutional\par o Appearance : well-nourished, well developed, alert, in no acute distress \par Head and Face\par o Head :\par ¦ Inspection : atraumatic, normocephalic\par o Face :\par ¦ Inspection : no visible rash or discoloration\par Respiratory\par o Respiratory Effort: breathing unlabored \par Neurologic\par o Sensation : Normal sensation \par Psychiatric\par o Mood and Affect: mood normal, affect appropriate \par Lymphatic\par o Additional Nodes : No palpable lymph nodes present \par \par Right Lower Extremity\par o Buttock : no tenderness, swelling or deformities\par o Right Hip :\par ¦ Inspection/Palpation : no tenderness, no swelling or deformities\par ¦ Range of Motion : full and painless in all planes, no crepitance\par ¦ Stability : joint stability intact\par ¦ Strength : extension, flexion, adduction, abduction, internal rotation and external rotation, 5/5\par ¦ Tests: Jeffry’s test negative \par \par o Right Knee :\par ¦ Inspection/Palpation : medial and lateral compartment tenderness to palpation, no swelling, valgus deformity\par ¦ Range of Motion : 0-110°, discomfort with flexion past 110° , no crepitance, good patellofemoral glide \par ¦ Stability : no valgus or varus instability present on provocative testing\par ¦ Strength : flexion and extension 5/5\par ¦ Tests and Signs : negative Anterior Drawer, negative Lachman, negative Jacques \par \par o Right Ankle :\par ¦ Inspection/Palpation : no tenderness to palpation, no swelling    \par ¦ Range of Motion : arc of motion full and painless in all planes\par ¦ Stability : no joint instability en provocative testing\par ¦ Strength : all muscles 5/5\par o Skin : no erythema or ecchymosis present\par o Sensation : sensation to pin intact\par o Tests and Signs : Doyle test negative \par \par o Right Foot:\par ¦ Inspection/Palpation : no tenderness to palpation, no swelling\par ¦ Range of Motion : arc of motion full and painless in all planes\par ¦ Stability : no joint instability on provocative testing\par ¦ Strength : all muscles 5/5\par o Skin : no erythema or ecchymosis present \par \par Left Lower Extremity\par o Buttock : no tenderness, swelling or deformities \par o Left Hip :\par ¦ Inspection/Palpation : no tenderness, no swelling or deformities\par ¦ Range of Motion : full and painless in all planes, no crepitance\par ¦ Stability : joint stability intact\par ¦ Strength : extension, flexion, adduction, abduction, internal rotation and external rotation, 5/5\par ¦ Tests: Jeffry’s test negative \par \par o Left Knee :\par ¦ Inspection/Palpation : no tenderness to palpation, no swelling\par ¦ Range of Motion : 0-120° , no crepitance\par ¦ Stability : no valgus or varus instability present on provocative testing\par ¦ Strength : flexion and extension 5/5\par ¦ Tests and Signs : negative Anterior Drawer, negative Lachman, negative Jacques \par \par o Left Ankle :\par ¦ Inspection/Palpation : posterior tibial tendon tenderness, no swelling    \par ¦ Range of Motion : FROM, pain with full plantarflexion and inversion\par ¦ Stability : no joint instability en provocative testing\par ¦ Strength : all muscles 4+/5\par o Skin : no erythema or ecchymosis present\par o Sensation : sensation to pin intact\par o Tests and Signs : Doyle test negative \par \par o Left Foot:\par ¦ Inspection/Palpation : mild midfoot tenderness to palpation, no metatarsal head tenderness, no swelling, hammertoes of all toes, crossover of the 4th toe on the 5th toe, cavus deformity\par ¦ Range of Motion : arc of motion full and painless in all planes\par ¦ Stability : no joint instability on provocative testing\par ¦ Strength : all muscles 4+/5\par o Skin : no erythema or ecchymosis present\par \par o Peripheral Vascular System :\par ¦ Dorsalis Pedis Artery : pulse 2+ bilaterally\par ¦ Posterior Tibial Artery : pulse 2+ bilaterally \par \par Gait and Station:\par Gait: stiff with better pushoff on the left, has an arch while seated but has a planovalgus deformity while standing, planovalgus deformity is nicely corrected by her orthotics, no significant extremity swelling or lymphedema, good proprioception and balance, no foot drop, good core strength

## 2022-07-29 NOTE — ADDENDUM
[FreeTextEntry1] : I, Von Rivera, acted solely as a scribe for Dr. Fracisco Lopez on this date 07/29/2022.\par All medical record entries made by the Scribe were at my, Dr. Fracisco Lopez, direction and personally dictated by me on 07/29/2022. I have reviewed the chart and agree that the record accurately reflects my personal performance of the history, physical exam, assessment and plan. I have also personally directed, reviewed, and agreed with the chart.

## 2022-07-29 NOTE — DISCUSSION/SUMMARY
[de-identified] : I went over the pathophysiology of the patient's symptoms in great detail with the patient and her . A discussion ensued about a total knee replacement. We had a lengthy discussion about the patient's issues, and talked about the benefits and risks of the procedure. The proper pre and post operative procedures and expectations were discussed in extensive detail with the patient. I informed her that if she allows her valgus deformity to keep progressing there is a higher risk of developing a drop foot postoperatively after the alignment is corrected. She should call her medical doctor and begin to obtain medical clearance. I advised her to bring a walking stick with her on her trip. All of their questions were answered. They understand and consent to the plan.\par \par FU 7-10 days before her right TKR.\par \par The patient is an 76 year old female with bone on bone arthritis of their right knee. Based upon the patient's continued symptoms and failure to respond to conservative treatment I have recommended a total knee replacement for this patient. A long discussion took place with the patient describing what a total joint replacement is and what the procedure would entail. A total knee model, similar to the implant that will be used during the operation was utilized to demonstrate and to discuss the various bearing surfaces of the implants. The hospitalization and post-operative care and rehabilitation were also discussed. The use of perioperative antibiotics and DVT prophylaxis were discussed. The risk, benefits and alternatives to a surgical intervention were discussed at length with the patient. The patient was also advised of risks related to the medical comorbidities and elevated body mass index (BMI). A lengthy discussion took place to review the most common complications including but not limited to: deep vein thrombosis, pulmonary embolus, heart attack, stroke, infection, wound breakdown, numbness, damage to nerves, tendon, muscles, arteries or other blood vessels, death and other possible complications from anesthesia. The patient was told that we will take steps to minimize these risks by using sterile technique, antibiotics and DVT prophylaxis when appropriate and follow the patient postoperatively in the office setting to monitor progress. The possibility of recurrent pain, no improvement in pain and actual worsening of pain were also discussed with the patient.\par \par The discharge plan of care focused on the patient going home following surgery. I encouraged the patient to make the necessary arrangements to have someone stay with them when they are discharged home. Following discharge, a home care nurse will visit the patient. They will open your home care case and request home physical therapy services. Home physical therapy will commence following discharge provided it is appropriate and covered by her health insurance benefit plan.\par \par The risks, benefits and alternative treatment options of total joint replacement were reviewed with the patient at great length. All questions were answered to the satisfaction of the patient. The patient participated in an interactive discussion of the total knee replacement implant planned for their surgery with questions answered. The patient agreed with the treatment plan, and has decided to move forward with the elective total knee replacement as planned.\par \par DELPHINE BEAULIEU was seen face to face and needs a commode for bedside use as the patient has no ability to acces the bathroom in their home. The patient also requires a rolling walker as this is needed for activities of daily living within their home secondary to the diagnosis of osteoarthritis.

## 2022-07-29 NOTE — HISTORY OF PRESENT ILLNESS
[de-identified] : 76 year old female presents complaining of R>L knee pain. She received a right knee cortisone injection on 05/09/22 and thinks it helped. She then had a Monovisc injection on 06/10/22. Her knee is feeling better, but ever since the injections she has pain into the lateral calf. She notes no swelling or buckling. She is scheduled for a right TKR on 10/11/2022 (after her trip to [place] in August). She has a lot of questions about surgery and is hesitant.\par \par Radiology Results 5/9/2022:\par o Right Knee : Standing AP, lateral, tunnel, and skyline views of the knee were obtained and reveal severe lateral and patellofemoral arthritis with bone on bone in the lateral compartment. No malalignment of the leg.\par o Left Knee : Standing AP, lateral, tunnel, and skyline views of the knee were obtained and reveal severe lateral and patellofemoral arthritis.

## 2022-09-30 ENCOUNTER — APPOINTMENT (OUTPATIENT)
Dept: ORTHOPEDIC SURGERY | Facility: CLINIC | Age: 77
End: 2022-09-30

## 2022-10-07 ENCOUNTER — APPOINTMENT (OUTPATIENT)
Dept: ORTHOPEDIC SURGERY | Facility: CLINIC | Age: 77
End: 2022-10-07

## 2022-10-07 PROCEDURE — 99214 OFFICE O/P EST MOD 30 MIN: CPT

## 2022-10-07 PROCEDURE — 73564 X-RAY EXAM KNEE 4 OR MORE: CPT | Mod: RT

## 2022-10-07 NOTE — DISCUSSION/SUMMARY
[de-identified] : I discussed the underlying pathophysiology of the patient's condition in great detail with them. I went over the patient's x-rays with them in great detail. We discussed the use of ice, Tylenol and anti-inflammatories to relieve pain. I informed her she is due for another right knee Monovisc injection after 12/10/22, but do not recommend she gets it because of the upcoming surgery. She is interested in rescheduling surgery for the beginning of November. My earliest available date is 1/31/23 at this time. I advised her to call the office every couple of weeks to see if we have any cancellations. I would like to see the patient back in the office in 6 weeks to reassess her condition. All of their questions were answered. They understand and consent to the plan.\par \par FU in 6 weeks.\par \par The patient is an 76 year old female with bone on bone arthritis of their right knee. Based upon the patient's continued symptoms and failure to respond to conservative treatment I have recommended a total knee replacement for this patient. A long discussion took place with the patient describing what a total joint replacement is and what the procedure would entail. A total knee model, similar to the implant that will be used during the operation was utilized to demonstrate and to discuss the various bearing surfaces of the implants. The hospitalization and post-operative care and rehabilitation were also discussed. The use of perioperative antibiotics and DVT prophylaxis were discussed. The risk, benefits and alternatives to a surgical intervention were discussed at length with the patient. The patient was also advised of risks related to the medical comorbidities and elevated body mass index (BMI). A lengthy discussion took place to review the most common complications including but not limited to: deep vein thrombosis, pulmonary embolus, heart attack, stroke, infection, wound breakdown, numbness, damage to nerves, tendon, muscles, arteries or other blood vessels, death and other possible complications from anesthesia. The patient was told that we will take steps to minimize these risks by using sterile technique, antibiotics and DVT prophylaxis when appropriate and follow the patient postoperatively in the office setting to monitor progress. The possibility of recurrent pain, no improvement in pain and actual worsening of pain were also discussed with the patient.\par \par The discharge plan of care focused on the patient going home following surgery. I encouraged the patient to make the necessary arrangements to have someone stay with them when they are discharged home. Following discharge, a home care nurse will visit the patient. They will open your home care case and request home physical therapy services. Home physical therapy will commence following discharge provided it is appropriate and covered by her health insurance benefit plan.\par \par The risks, benefits and alternative treatment options of total joint replacement were reviewed with the patient at great length. All questions were answered to the satisfaction of the patient. The patient participated in an interactive discussion of the total knee replacement implant planned for their surgery with questions answered. The patient agreed with the treatment plan, and has decided to move forward with the elective total knee replacement as planned.\par \par DELPHINE BEAULIEU was seen face to face and needs a commode for bedside use as the patient has no ability to acces the bathroom in their home. The patient also requires a rolling walker as this is needed for activities of daily living within their home secondary to the diagnosis of osteoarthritis.

## 2022-10-07 NOTE — ADDENDUM
[FreeTextEntry1] : I, Von Rivera, acted solely as a scribe for Dr. Fracisco Lopez on this date 10/07/2022.\par \par All medical record entries made by the Scribe were at my, Dr. Fracisco Lopez, direction and personally dictated by me on 10/07/2022. I have reviewed the chart and agree that the record accurately reflects my personal performance of the history, physical exam, assessment and plan. I have also personally directed, reviewed, and agreed with the chart.
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
## 2022-10-07 NOTE — PHYSICAL EXAM
[LE] : Sensory: Intact in bilateral lower extremities [de-identified] : Constitutional\par o Appearance : well-nourished, well developed, alert, in no acute distress \par Head and Face\par o Head :\par ¦ Inspection : atraumatic, normocephalic\par o Face :\par ¦ Inspection : no visible rash or discoloration\par Respiratory\par o Respiratory Effort: breathing unlabored \par Neurologic\par o Sensation : Normal sensation \par Psychiatric\par o Mood and Affect: mood normal, affect appropriate \par Lymphatic\par o Additional Nodes : No palpable lymph nodes present \par \par Right Lower Extremity\par o Buttock : no tenderness, swelling or deformities\par o Right Hip :\par ¦ Inspection/Palpation : no tenderness, no swelling or deformities\par ¦ Range of Motion : full and painless in all planes, no crepitance\par ¦ Stability : joint stability intact\par ¦ Strength : extension, flexion, adduction, abduction, internal rotation and external rotation, 5/5\par ¦ Tests: Jeffry’s test negative \par \par o Right Knee :\par ¦ Inspection/Palpation : medial and very significant lateral compartment tenderness, no swelling, valgus deformity\par ¦ Range of Motion : 0-105°, discomfort with full flexion , no crepitance, good patellofemoral glide \par ¦ Stability : mild valgus / varus instability present on provocative testing\par ¦ Strength : flexion and extension 5/5\par ¦ Tests and Signs : negative Anterior Drawer, negative Lachman, negative Jacques\par \par o Right Ankle :\par ¦ Inspection/Palpation : no tenderness to palpation, no swelling    \par ¦ Range of Motion : arc of motion full and painless in all planes\par ¦ Stability : no joint instability en provocative testing\par ¦ Strength : all muscles 5/5\par o Skin : no erythema or ecchymosis present\par o Sensation : sensation to pin intact\par o Tests and Signs : Doyle test negative \par \par o Right Foot:\par ¦ Inspection/Palpation : no tenderness to palpation, no swelling\par ¦ Range of Motion : arc of motion full and painless in all planes\par ¦ Stability : no joint instability on provocative testing\par ¦ Strength : all muscles 5/5\par o Skin : no erythema or ecchymosis present \par \par Left Lower Extremity\par o Buttock : no tenderness, swelling or deformities \par o Left Hip :\par ¦ Inspection/Palpation : no tenderness, no swelling or deformities\par ¦ Range of Motion : full and painless in all planes, no crepitance\par ¦ Stability : joint stability intact\par ¦ Strength : extension, flexion, adduction, abduction, internal rotation and external rotation, 5/5\par ¦ Tests: Jeffry’s test negative \par \par o Left Knee :\par ¦ Inspection/Palpation : no tenderness to palpation, no swelling\par ¦ Range of Motion : 0-120° , no crepitance\par ¦ Stability : no valgus or varus instability present on provocative testing\par ¦ Strength : flexion and extension 5/5\par ¦ Tests and Signs : negative Anterior Drawer, negative Lachman, negative Jacques \par \par o Left Ankle :\par ¦ Inspection/Palpation : posterior tibial tendon tenderness, no swelling    \par ¦ Range of Motion : FROM, pain with full plantarflexion and inversion\par ¦ Stability : no joint instability en provocative testing\par ¦ Strength : all muscles 4+/5\par o Skin : no erythema or ecchymosis present\par o Sensation : sensation to pin intact\par o Tests and Signs : Doyle test negative \par \par o Left Foot:\par ¦ Inspection/Palpation : mild midfoot tenderness to palpation, no metatarsal head tenderness, no swelling, hammertoes of all toes, crossover of the 4th toe on the 5th toe, cavus deformity\par ¦ Range of Motion : arc of motion full and painless in all planes\par ¦ Stability : no joint instability on provocative testing\par ¦ Strength : all muscles 4+/5\par o Skin : no erythema or ecchymosis present\par \par o Peripheral Vascular System :\par ¦ Dorsalis Pedis Artery : pulse 2+ bilaterally\par ¦ Posterior Tibial Artery : pulse 2+ bilaterally \par \par Gait and Station:\par Gait: stiff with better pushoff on the left, has an arch while seated but has a planovalgus deformity while standing, planovalgus deformity is nicely corrected by her orthotics, no significant extremity swelling or lymphedema, good proprioception and balance, no foot drop, good core strength\par \par Radiology Results\par o Right Knee : Standing AP, lateral, tunnel, and skyline views of the knee were obtained and revealed bone on bone in the lateral compartment with severe patellofemoral arthritis. THESE ARE TEMPLATED FILMS.

## 2022-10-11 ENCOUNTER — APPOINTMENT (OUTPATIENT)
Dept: ORTHOPEDIC SURGERY | Facility: HOSPITAL | Age: 77
End: 2022-10-11

## 2022-10-28 ENCOUNTER — APPOINTMENT (OUTPATIENT)
Dept: ORTHOPEDIC SURGERY | Facility: CLINIC | Age: 77
End: 2022-10-28

## 2022-11-18 ENCOUNTER — APPOINTMENT (OUTPATIENT)
Dept: ORTHOPEDIC SURGERY | Facility: CLINIC | Age: 77
End: 2022-11-18

## 2022-11-18 VITALS — BODY MASS INDEX: 32.1 KG/M2 | HEIGHT: 61 IN | WEIGHT: 170 LBS

## 2022-11-18 PROCEDURE — 99214 OFFICE O/P EST MOD 30 MIN: CPT
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
## 2022-11-18 NOTE — DISCUSSION/SUMMARY
[de-identified] : I discussed the underlying pathophysiology of the patient's condition in great detail with them. I went over the patient's x-rays with them in great detail. We discussed the use of ice, Tylenol and anti-inflammatories to relieve pain. I informed her she is due for another right knee Monovisc injection after 12/10/22, but do not recommend she gets it because of the upcoming surgery. She is interested in rescheduling surgery for the beginning of November. My earliest available date is 1/31/23 at this time. I advised her to call the office every couple of weeks to see if we have any cancellations. I would like to see the patient back in the office in 6 weeks to reassess her condition. All of their questions were answered. They understand and consent to the plan.\par \par FU mid January 2023\par \par The patient is an 76 year old female with bone on bone arthritis of their right knee. Based upon the patient's continued symptoms and failure to respond to conservative treatment I have recommended a total knee replacement for this patient. A long discussion took place with the patient describing what a total joint replacement is and what the procedure would entail. A total knee model, similar to the implant that will be used during the operation was utilized to demonstrate and to discuss the various bearing surfaces of the implants. The hospitalization and post-operative care and rehabilitation were also discussed. The use of perioperative antibiotics and DVT prophylaxis were discussed. The risk, benefits and alternatives to a surgical intervention were discussed at length with the patient. The patient was also advised of risks related to the medical comorbidities and elevated body mass index (BMI). A lengthy discussion took place to review the most common complications including but not limited to: deep vein thrombosis, pulmonary embolus, heart attack, stroke, infection, wound breakdown, numbness, damage to nerves, tendon, muscles, arteries or other blood vessels, death and other possible complications from anesthesia. The patient was told that we will take steps to minimize these risks by using sterile technique, antibiotics and DVT prophylaxis when appropriate and follow the patient postoperatively in the office setting to monitor progress. The possibility of recurrent pain, no improvement in pain and actual worsening of pain were also discussed with the patient.\par \par The discharge plan of care focused on the patient going home following surgery. I encouraged the patient to make the necessary arrangements to have someone stay with them when they are discharged home. Following discharge, a home care nurse will visit the patient. They will open your home care case and request home physical therapy services. Home physical therapy will commence following discharge provided it is appropriate and covered by her health insurance benefit plan.\par \par The risks, benefits and alternative treatment options of total joint replacement were reviewed with the patient at great length. All questions were answered to the satisfaction of the patient. The patient participated in an interactive discussion of the total knee replacement implant planned for their surgery with questions answered. The patient agreed with the treatment plan, and has decided to move forward with the elective total knee replacement as planned.\par \par DELPHINE BEAULIEU was seen face to face and needs a commode for bedside use as the patient has no ability to acces the bathroom in their home. The patient also requires a rolling walker as this is needed for activities of daily living within their home secondary to the diagnosis of osteoarthritis.

## 2022-11-18 NOTE — PHYSICAL EXAM
[LE] : Sensory: Intact in bilateral lower extremities [de-identified] : Constitutional\par o Appearance : well-nourished, well developed, alert, in no acute distress \par Head and Face\par o Head :\par ¦ Inspection : atraumatic, normocephalic\par o Face :\par ¦ Inspection : no visible rash or discoloration\par Respiratory\par o Respiratory Effort: breathing unlabored \par Neurologic\par o Sensation : Normal sensation \par Psychiatric\par o Mood and Affect: mood normal, affect appropriate \par Lymphatic\par o Additional Nodes : No palpable lymph nodes present \par \par Right Lower Extremity\par o Buttock : no tenderness, swelling or deformities\par o Right Hip :\par ¦ Inspection/Palpation : no tenderness, no swelling or deformities\par ¦ Range of Motion : full and painless in all planes, no crepitance\par ¦ Stability : joint stability intact\par ¦ Strength : extension, flexion, adduction, abduction, internal rotation and external rotation, 5/5\par ¦ Tests: Jeffry’s test negative \par \par o Right Knee :\par ¦ Inspection/Palpation : medial and lateral compartment tenderness, no swelling, valgus deformity\par ¦ Range of Motion : 0-125°, discomfort with full flexion , no crepitance, good patellofemoral glide \par ¦ Stability : mild valgus / varus instability present on provocative testing\par ¦ Strength : flexion and extension 5/5\par ¦ Tests and Signs : negative Anterior Drawer, negative Lachman, negative Jacques\par Decreased patellar glide \par \par o Right Ankle :\par ¦ Inspection/Palpation : no tenderness to palpation, no swelling    \par ¦ Range of Motion : arc of motion full and painless in all planes\par ¦ Stability : no joint instability en provocative testing\par ¦ Strength : all muscles 5/5\par o Skin : no erythema or ecchymosis present\par o Sensation : sensation to pin intact\par o Tests and Signs : Doyle test negative \par \par o Right Foot:\par ¦ Inspection/Palpation : no tenderness to palpation, no swelling\par ¦ Range of Motion : arc of motion full and painless in all planes\par ¦ Stability : no joint instability on provocative testing\par ¦ Strength : all muscles 5/5\par o Skin : no erythema or ecchymosis present \par \par Left Lower Extremity\par o Buttock : no tenderness, swelling or deformities \par o Left Hip :\par ¦ Inspection/Palpation : no tenderness, no swelling or deformities\par ¦ Range of Motion : full and painless in all planes, no crepitance\par ¦ Stability : joint stability intact\par ¦ Strength : extension, flexion, adduction, abduction, internal rotation and external rotation, 5/5\par ¦ Tests: Jeffry’s test negative \par \par o Left Knee :\par ¦ Inspection/Palpation : no tenderness to palpation, no swelling, good patellofemoral glide \par ¦ Range of Motion : 0-125° , no crepitance\par ¦ Stability : no valgus or varus instability present on provocative testing\par ¦ Strength : flexion and extension 5/5\par ¦ Tests and Signs : negative Anterior Drawer, negative Lachman, negative Jacques \par \par o Left Ankle :\par ¦ Inspection/Palpation : posterior tibial tendon tenderness, no swelling    \par ¦ Range of Motion : FROM, pain with full plantarflexion and inversion\par ¦ Stability : no joint instability en provocative testing\par ¦ Strength : all muscles 4+/5\par o Skin : no erythema or ecchymosis present\par o Sensation : sensation to pin intact\par o Tests and Signs : Doyle test negative \par \par o Left Foot:\par ¦ Inspection/Palpation : mild midfoot tenderness to palpation, no metatarsal head tenderness, no swelling, hammertoes of all toes, crossover of the 4th toe on the 5th toe, cavus deformity\par ¦ Range of Motion : arc of motion full and painless in all planes\par ¦ Stability : no joint instability on provocative testing\par ¦ Strength : all muscles 4+/5\par o Skin : no erythema or ecchymosis present\par \par o Peripheral Vascular System :\par ¦ Dorsalis Pedis Artery : pulse 2+ bilaterally\par ¦ Posterior Tibial Artery : pulse 2+ bilaterally \par \par Gait and Station:\par Valgus deformity right knee\par Gait: stiff with better pushoff on the left, has an arch while seated but has a planovalgus deformity while standing, planovalgus deformity is nicely corrected by her orthotics, no significant extremity swelling or lymphedema, good proprioception and balance, no foot drop, good core strength\par \par Radiology Results\par o Right Knee : Standing AP, lateral, tunnel, and skyline views of the knee were obtained and revealed bone on bone in the lateral compartment with severe patellofemoral arthritis. THESE ARE TEMPLATED FILMS.

## 2023-01-12 ENCOUNTER — OUTPATIENT (OUTPATIENT)
Dept: OUTPATIENT SERVICES | Facility: HOSPITAL | Age: 78
LOS: 1 days | End: 2023-01-12
Payer: MEDICARE

## 2023-01-12 VITALS
SYSTOLIC BLOOD PRESSURE: 180 MMHG | TEMPERATURE: 97 F | HEART RATE: 70 BPM | RESPIRATION RATE: 14 BRPM | DIASTOLIC BLOOD PRESSURE: 77 MMHG | OXYGEN SATURATION: 97 % | HEIGHT: 60 IN | WEIGHT: 171.08 LBS

## 2023-01-12 DIAGNOSIS — M17.11 UNILATERAL PRIMARY OSTEOARTHRITIS, RIGHT KNEE: ICD-10-CM

## 2023-01-12 DIAGNOSIS — Z01.818 ENCOUNTER FOR OTHER PREPROCEDURAL EXAMINATION: ICD-10-CM

## 2023-01-12 DIAGNOSIS — Z98.890 OTHER SPECIFIED POSTPROCEDURAL STATES: Chronic | ICD-10-CM

## 2023-01-12 DIAGNOSIS — Z98.89 OTHER SPECIFIED POSTPROCEDURAL STATES: Chronic | ICD-10-CM

## 2023-01-12 DIAGNOSIS — Z90.710 ACQUIRED ABSENCE OF BOTH CERVIX AND UTERUS: Chronic | ICD-10-CM

## 2023-01-12 LAB
A1C WITH ESTIMATED AVERAGE GLUCOSE RESULT: 5.4 % — SIGNIFICANT CHANGE UP (ref 4–5.6)
ALBUMIN SERPL ELPH-MCNC: 3.8 G/DL — SIGNIFICANT CHANGE UP (ref 3.3–5)
ALP SERPL-CCNC: 84 U/L — SIGNIFICANT CHANGE UP (ref 30–120)
ALT FLD-CCNC: 27 U/L DA — SIGNIFICANT CHANGE UP (ref 10–60)
ANION GAP SERPL CALC-SCNC: 4 MMOL/L — LOW (ref 5–17)
APTT BLD: 32.8 SEC — SIGNIFICANT CHANGE UP (ref 27.5–35.5)
AST SERPL-CCNC: 22 U/L — SIGNIFICANT CHANGE UP (ref 10–40)
BILIRUB SERPL-MCNC: 1.2 MG/DL — SIGNIFICANT CHANGE UP (ref 0.2–1.2)
BLD GP AB SCN SERPL QL: SIGNIFICANT CHANGE UP
BUN SERPL-MCNC: 12 MG/DL — SIGNIFICANT CHANGE UP (ref 7–23)
CALCIUM SERPL-MCNC: 9.2 MG/DL — SIGNIFICANT CHANGE UP (ref 8.4–10.5)
CHLORIDE SERPL-SCNC: 101 MMOL/L — SIGNIFICANT CHANGE UP (ref 96–108)
CO2 SERPL-SCNC: 32 MMOL/L — HIGH (ref 22–31)
CREAT SERPL-MCNC: 0.72 MG/DL — SIGNIFICANT CHANGE UP (ref 0.5–1.3)
EGFR: 87 ML/MIN/1.73M2 — SIGNIFICANT CHANGE UP
ESTIMATED AVERAGE GLUCOSE: 108 MG/DL — SIGNIFICANT CHANGE UP (ref 68–114)
GLUCOSE SERPL-MCNC: 102 MG/DL — HIGH (ref 70–99)
HCT VFR BLD CALC: 42.1 % — SIGNIFICANT CHANGE UP (ref 34.5–45)
HGB BLD-MCNC: 13.6 G/DL — SIGNIFICANT CHANGE UP (ref 11.5–15.5)
INR BLD: 1 RATIO — SIGNIFICANT CHANGE UP (ref 0.88–1.16)
MCHC RBC-ENTMCNC: 29.6 PG — SIGNIFICANT CHANGE UP (ref 27–34)
MCHC RBC-ENTMCNC: 32.3 GM/DL — SIGNIFICANT CHANGE UP (ref 32–36)
MCV RBC AUTO: 91.7 FL — SIGNIFICANT CHANGE UP (ref 80–100)
MRSA PCR RESULT.: SIGNIFICANT CHANGE UP
NRBC # BLD: 0 /100 WBCS — SIGNIFICANT CHANGE UP (ref 0–0)
PLATELET # BLD AUTO: 244 K/UL — SIGNIFICANT CHANGE UP (ref 150–400)
POTASSIUM SERPL-MCNC: 5.1 MMOL/L — SIGNIFICANT CHANGE UP (ref 3.5–5.3)
POTASSIUM SERPL-SCNC: 5.1 MMOL/L — SIGNIFICANT CHANGE UP (ref 3.5–5.3)
PROT SERPL-MCNC: 7 G/DL — SIGNIFICANT CHANGE UP (ref 6–8.3)
PROTHROM AB SERPL-ACNC: 11.8 SEC — SIGNIFICANT CHANGE UP (ref 10.5–13.4)
RBC # BLD: 4.59 M/UL — SIGNIFICANT CHANGE UP (ref 3.8–5.2)
RBC # FLD: 13.7 % — SIGNIFICANT CHANGE UP (ref 10.3–14.5)
S AUREUS DNA NOSE QL NAA+PROBE: SIGNIFICANT CHANGE UP
SODIUM SERPL-SCNC: 137 MMOL/L — SIGNIFICANT CHANGE UP (ref 135–145)
WBC # BLD: 6.84 K/UL — SIGNIFICANT CHANGE UP (ref 3.8–10.5)
WBC # FLD AUTO: 6.84 K/UL — SIGNIFICANT CHANGE UP (ref 3.8–10.5)

## 2023-01-12 PROCEDURE — 85730 THROMBOPLASTIN TIME PARTIAL: CPT

## 2023-01-12 PROCEDURE — 83036 HEMOGLOBIN GLYCOSYLATED A1C: CPT

## 2023-01-12 PROCEDURE — 85610 PROTHROMBIN TIME: CPT

## 2023-01-12 PROCEDURE — G0463: CPT

## 2023-01-12 PROCEDURE — 36415 COLL VENOUS BLD VENIPUNCTURE: CPT

## 2023-01-12 PROCEDURE — 86900 BLOOD TYPING SEROLOGIC ABO: CPT

## 2023-01-12 PROCEDURE — 85027 COMPLETE CBC AUTOMATED: CPT

## 2023-01-12 PROCEDURE — 93010 ELECTROCARDIOGRAM REPORT: CPT

## 2023-01-12 PROCEDURE — 87641 MR-STAPH DNA AMP PROBE: CPT

## 2023-01-12 PROCEDURE — 87640 STAPH A DNA AMP PROBE: CPT

## 2023-01-12 PROCEDURE — 86850 RBC ANTIBODY SCREEN: CPT

## 2023-01-12 PROCEDURE — 93005 ELECTROCARDIOGRAM TRACING: CPT

## 2023-01-12 PROCEDURE — 86901 BLOOD TYPING SEROLOGIC RH(D): CPT

## 2023-01-12 PROCEDURE — 80053 COMPREHEN METABOLIC PANEL: CPT

## 2023-01-12 RX ORDER — FAMOTIDINE 10 MG/ML
1 INJECTION INTRAVENOUS
Qty: 0 | Refills: 0 | DISCHARGE

## 2023-01-12 RX ORDER — HYDROCHLOROTHIAZIDE 25 MG
1 TABLET ORAL
Qty: 0 | Refills: 0 | DISCHARGE

## 2023-01-12 RX ORDER — POLYETHYLENE GLYCOL 3350 17 G/17G
0 POWDER, FOR SOLUTION ORAL
Qty: 0 | Refills: 0 | DISCHARGE

## 2023-01-12 RX ORDER — LABETALOL HCL 100 MG
0 TABLET ORAL
Qty: 0 | Refills: 0 | DISCHARGE

## 2023-01-12 RX ORDER — MONTELUKAST 4 MG/1
1 TABLET, CHEWABLE ORAL
Qty: 0 | Refills: 0 | DISCHARGE

## 2023-01-12 RX ORDER — PANTOPRAZOLE SODIUM 20 MG/1
1 TABLET, DELAYED RELEASE ORAL
Qty: 0 | Refills: 0 | DISCHARGE

## 2023-01-12 RX ORDER — OLMESARTAN MEDOXOMIL 5 MG/1
1 TABLET, FILM COATED ORAL
Qty: 0 | Refills: 0 | DISCHARGE

## 2023-01-12 NOTE — H&P PST ADULT - MUSCULOSKELETAL
details… no joint erythema/no joint warmth/no calf tenderness/decreased ROM due to pain/no chest wall tenderness/extremities exam

## 2023-01-12 NOTE — H&P PST ADULT - PROBLEM SELECTOR PLAN 1
Right total knee replacement is scheduled for 1/31/2023  Covid testing scheduled for 1/29/2023 @ [facility]  Diagnostic testing performed  medical and cardiac clearances requested  Pre op instructions were reviewed including mupirocin, chlorhexadine and gatorade  Best wishes offered

## 2023-01-12 NOTE — H&P PST ADULT - NSICDXPASTMEDICALHX_GEN_ALL_CORE_FT
PAST MEDICAL HISTORY:  Allergic rhinitis     Hyperlipidemia     Hypertension     Osteoarthritis     Osteoarthritis of right knee      PAST MEDICAL HISTORY:  Allergic rhinitis     Chronic constipation     GERD (gastroesophageal reflux disease)     Hyperlipidemia     Hypertension     Osteoarthritis     Osteoarthritis of right knee     Varicose veins      PAST MEDICAL HISTORY:  Allergic rhinitis     Chronic constipation     Class 1 obesity with body mass index (BMI) of 33.0 to 33.9 in adult     GERD (gastroesophageal reflux disease)     Hyperlipidemia     Hypertension     Osteoarthritis     Osteoarthritis of right knee     Varicose veins

## 2023-01-12 NOTE — H&P PST ADULT - NSICDXPASTSURGICALHX_GEN_ALL_CORE_FT
PAST SURGICAL HISTORY:  History of esophageal surgery     S/P bladder repair reconstruction 1998/2000    prolapsed bladder    S/P breast biopsy, left 1976    S/P hysterectomy with oophorectomy 1995

## 2023-01-12 NOTE — H&P PST ADULT - NSICDXFAMILYHX_GEN_ALL_CORE_FT
FAMILY HISTORY:  Father  Still living? No  Family history of COPD (chronic obstructive pulmonary disease), Age at diagnosis:     Mother  Still living? No  Family history of breast cancer, Age at diagnosis: 71-80    Sibling  Still living? Yes, Estimated age: Age Unknown  Family history of glaucoma in brother, Age at diagnosis: Age Unknown

## 2023-01-12 NOTE — H&P PST ADULT - HISTORY OF PRESENT ILLNESS
77  78 yo  78 yo female reports right knee pain and swelling for >4 years rating 10/10 when she is weight bearing.  She has tried HA and cortisone injections without relief.  She is scheduled for right total knee replacement on 1/31/2023 @ [facility].

## 2023-01-13 ENCOUNTER — APPOINTMENT (OUTPATIENT)
Dept: ORTHOPEDIC SURGERY | Facility: CLINIC | Age: 78
End: 2023-01-13
Payer: MEDICARE

## 2023-01-13 PROBLEM — K59.09 OTHER CONSTIPATION: Chronic | Status: ACTIVE | Noted: 2023-01-12

## 2023-01-13 PROBLEM — I83.90 ASYMPTOMATIC VARICOSE VEINS OF UNSPECIFIED LOWER EXTREMITY: Chronic | Status: ACTIVE | Noted: 2023-01-12

## 2023-01-13 PROBLEM — E66.9 OBESITY, UNSPECIFIED: Chronic | Status: ACTIVE | Noted: 2023-01-12

## 2023-01-13 PROBLEM — K21.9 GASTRO-ESOPHAGEAL REFLUX DISEASE WITHOUT ESOPHAGITIS: Chronic | Status: ACTIVE | Noted: 2023-01-12

## 2023-01-13 PROBLEM — M17.11 UNILATERAL PRIMARY OSTEOARTHRITIS, RIGHT KNEE: Chronic | Status: ACTIVE | Noted: 2023-01-12

## 2023-01-13 PROCEDURE — 99214 OFFICE O/P EST MOD 30 MIN: CPT

## 2023-01-16 DIAGNOSIS — M17.11 UNILATERAL PRIMARY OSTEOARTHRITIS, RIGHT KNEE: ICD-10-CM

## 2023-01-29 ENCOUNTER — OUTPATIENT (OUTPATIENT)
Dept: OUTPATIENT SERVICES | Facility: HOSPITAL | Age: 78
LOS: 1 days | End: 2023-01-29

## 2023-01-29 DIAGNOSIS — Z90.710 ACQUIRED ABSENCE OF BOTH CERVIX AND UTERUS: Chronic | ICD-10-CM

## 2023-01-29 DIAGNOSIS — Z98.89 OTHER SPECIFIED POSTPROCEDURAL STATES: Chronic | ICD-10-CM

## 2023-01-29 DIAGNOSIS — Z20.828 CONTACT WITH AND (SUSPECTED) EXPOSURE TO OTHER VIRAL COMMUNICABLE DISEASES: ICD-10-CM

## 2023-01-29 DIAGNOSIS — Z98.890 OTHER SPECIFIED POSTPROCEDURAL STATES: Chronic | ICD-10-CM

## 2023-01-30 ENCOUNTER — TRANSCRIPTION ENCOUNTER (OUTPATIENT)
Age: 78
End: 2023-01-30

## 2023-01-31 ENCOUNTER — INPATIENT (INPATIENT)
Facility: HOSPITAL | Age: 78
LOS: 0 days | Discharge: HOME | DRG: 470 | End: 2023-02-01
Attending: SPECIALIST | Admitting: SPECIALIST
Payer: COMMERCIAL

## 2023-01-31 ENCOUNTER — APPOINTMENT (OUTPATIENT)
Dept: ORTHOPEDIC SURGERY | Facility: HOSPITAL | Age: 78
End: 2023-01-31

## 2023-01-31 ENCOUNTER — TRANSCRIPTION ENCOUNTER (OUTPATIENT)
Age: 78
End: 2023-01-31

## 2023-01-31 VITALS
OXYGEN SATURATION: 100 % | WEIGHT: 175.93 LBS | TEMPERATURE: 98 F | DIASTOLIC BLOOD PRESSURE: 70 MMHG | HEART RATE: 62 BPM | RESPIRATION RATE: 19 BRPM | SYSTOLIC BLOOD PRESSURE: 141 MMHG | HEIGHT: 61 IN

## 2023-01-31 DIAGNOSIS — Z98.89 OTHER SPECIFIED POSTPROCEDURAL STATES: Chronic | ICD-10-CM

## 2023-01-31 DIAGNOSIS — Z90.710 ACQUIRED ABSENCE OF BOTH CERVIX AND UTERUS: Chronic | ICD-10-CM

## 2023-01-31 DIAGNOSIS — Z98.890 OTHER SPECIFIED POSTPROCEDURAL STATES: Chronic | ICD-10-CM

## 2023-01-31 DIAGNOSIS — M17.11 UNILATERAL PRIMARY OSTEOARTHRITIS, RIGHT KNEE: ICD-10-CM

## 2023-01-31 PROCEDURE — 99223 1ST HOSP IP/OBS HIGH 75: CPT

## 2023-01-31 PROCEDURE — 73560 X-RAY EXAM OF KNEE 1 OR 2: CPT | Mod: 26,RT

## 2023-01-31 PROCEDURE — 27447 TOTAL KNEE ARTHROPLASTY: CPT | Mod: RT

## 2023-01-31 PROCEDURE — 27447 TOTAL KNEE ARTHROPLASTY: CPT | Mod: AS,RT

## 2023-01-31 DEVICE — TRAY TIB I-BEAM FIX BAR 75MM: Type: IMPLANTABLE DEVICE | Status: FUNCTIONAL

## 2023-01-31 DEVICE — BEARING TIB VANGUARD VE PS 71/75X14MM: Type: IMPLANTABLE DEVICE | Status: FUNCTIONAL

## 2023-01-31 DEVICE — FEMORAL INTERLOCK RIGHT 60MM: Type: IMPLANTABLE DEVICE | Status: FUNCTIONAL

## 2023-01-31 DEVICE — IMPLANTABLE DEVICE: Type: IMPLANTABLE DEVICE | Status: FUNCTIONAL

## 2023-01-31 RX ORDER — APREPITANT 80 MG/1
40 CAPSULE ORAL ONCE
Refills: 0 | Status: COMPLETED | OUTPATIENT
Start: 2023-01-31 | End: 2023-01-31

## 2023-01-31 RX ORDER — MAGNESIUM HYDROXIDE 400 MG/1
30 TABLET, CHEWABLE ORAL DAILY
Refills: 0 | Status: DISCONTINUED | OUTPATIENT
Start: 2023-01-31 | End: 2023-02-01

## 2023-01-31 RX ORDER — APIXABAN 2.5 MG/1
2.5 TABLET, FILM COATED ORAL EVERY 12 HOURS
Refills: 0 | Status: DISCONTINUED | OUTPATIENT
Start: 2023-02-02 | End: 2023-02-01

## 2023-01-31 RX ORDER — APIXABAN 2.5 MG/1
2.5 TABLET, FILM COATED ORAL
Refills: 0 | Status: DISCONTINUED | OUTPATIENT
Start: 2023-02-01 | End: 2023-02-01

## 2023-01-31 RX ORDER — LABETALOL HCL 100 MG
100 TABLET ORAL AT BEDTIME
Refills: 0 | Status: DISCONTINUED | OUTPATIENT
Start: 2023-01-31 | End: 2023-02-01

## 2023-01-31 RX ORDER — ATORVASTATIN CALCIUM 80 MG/1
40 TABLET, FILM COATED ORAL AT BEDTIME
Refills: 0 | Status: DISCONTINUED | OUTPATIENT
Start: 2023-01-31 | End: 2023-02-01

## 2023-01-31 RX ORDER — HYDROMORPHONE HYDROCHLORIDE 2 MG/ML
0.5 INJECTION INTRAMUSCULAR; INTRAVENOUS; SUBCUTANEOUS
Refills: 0 | Status: DISCONTINUED | OUTPATIENT
Start: 2023-01-31 | End: 2023-02-01

## 2023-01-31 RX ORDER — ONDANSETRON 8 MG/1
4 TABLET, FILM COATED ORAL ONCE
Refills: 0 | Status: DISCONTINUED | OUTPATIENT
Start: 2023-01-31 | End: 2023-01-31

## 2023-01-31 RX ORDER — ONDANSETRON 8 MG/1
4 TABLET, FILM COATED ORAL EVERY 6 HOURS
Refills: 0 | Status: DISCONTINUED | OUTPATIENT
Start: 2023-01-31 | End: 2023-02-01

## 2023-01-31 RX ORDER — SODIUM CHLORIDE 9 MG/ML
500 INJECTION INTRAMUSCULAR; INTRAVENOUS; SUBCUTANEOUS ONCE
Refills: 0 | Status: COMPLETED | OUTPATIENT
Start: 2023-01-31 | End: 2023-01-31

## 2023-01-31 RX ORDER — CELECOXIB 200 MG/1
200 CAPSULE ORAL EVERY 12 HOURS
Refills: 0 | Status: DISCONTINUED | OUTPATIENT
Start: 2023-01-31 | End: 2023-01-31

## 2023-01-31 RX ORDER — TRANEXAMIC ACID 100 MG/ML
1000 INJECTION, SOLUTION INTRAVENOUS ONCE
Refills: 0 | Status: COMPLETED | OUTPATIENT
Start: 2023-01-31 | End: 2023-01-31

## 2023-01-31 RX ORDER — LOSARTAN POTASSIUM 100 MG/1
100 TABLET, FILM COATED ORAL DAILY
Refills: 0 | Status: DISCONTINUED | OUTPATIENT
Start: 2023-02-02 | End: 2023-02-01

## 2023-01-31 RX ORDER — SODIUM CHLORIDE 9 MG/ML
1000 INJECTION, SOLUTION INTRAVENOUS
Refills: 0 | Status: DISCONTINUED | OUTPATIENT
Start: 2023-01-31 | End: 2023-02-01

## 2023-01-31 RX ORDER — OXYCODONE HYDROCHLORIDE 5 MG/1
10 TABLET ORAL
Refills: 0 | Status: DISCONTINUED | OUTPATIENT
Start: 2023-01-31 | End: 2023-02-01

## 2023-01-31 RX ORDER — CHLORHEXIDINE GLUCONATE 213 G/1000ML
1 SOLUTION TOPICAL ONCE
Refills: 0 | Status: COMPLETED | OUTPATIENT
Start: 2023-01-31 | End: 2023-01-31

## 2023-01-31 RX ORDER — HYDROMORPHONE HYDROCHLORIDE 2 MG/ML
0.5 INJECTION INTRAMUSCULAR; INTRAVENOUS; SUBCUTANEOUS
Refills: 0 | Status: DISCONTINUED | OUTPATIENT
Start: 2023-01-31 | End: 2023-01-31

## 2023-01-31 RX ORDER — HYDROMORPHONE HYDROCHLORIDE 2 MG/ML
0.25 INJECTION INTRAMUSCULAR; INTRAVENOUS; SUBCUTANEOUS
Refills: 0 | Status: DISCONTINUED | OUTPATIENT
Start: 2023-01-31 | End: 2023-01-31

## 2023-01-31 RX ORDER — MONTELUKAST 4 MG/1
10 TABLET, CHEWABLE ORAL DAILY
Refills: 0 | Status: DISCONTINUED | OUTPATIENT
Start: 2023-01-31 | End: 2023-02-01

## 2023-01-31 RX ORDER — OXYCODONE HYDROCHLORIDE 5 MG/1
5 TABLET ORAL
Refills: 0 | Status: DISCONTINUED | OUTPATIENT
Start: 2023-01-31 | End: 2023-02-01

## 2023-01-31 RX ORDER — FAMOTIDINE 10 MG/ML
40 INJECTION INTRAVENOUS AT BEDTIME
Refills: 0 | Status: DISCONTINUED | OUTPATIENT
Start: 2023-01-31 | End: 2023-02-01

## 2023-01-31 RX ORDER — DEXAMETHASONE 0.5 MG/5ML
8 ELIXIR ORAL ONCE
Refills: 0 | Status: COMPLETED | OUTPATIENT
Start: 2023-02-01 | End: 2023-02-01

## 2023-01-31 RX ORDER — ACETAMINOPHEN 500 MG
1000 TABLET ORAL ONCE
Refills: 0 | Status: COMPLETED | OUTPATIENT
Start: 2023-01-31 | End: 2023-01-31

## 2023-01-31 RX ORDER — SODIUM CHLORIDE 9 MG/ML
1000 INJECTION, SOLUTION INTRAVENOUS
Refills: 0 | Status: DISCONTINUED | OUTPATIENT
Start: 2023-01-31 | End: 2023-01-31

## 2023-01-31 RX ORDER — PANTOPRAZOLE SODIUM 20 MG/1
40 TABLET, DELAYED RELEASE ORAL
Refills: 0 | Status: DISCONTINUED | OUTPATIENT
Start: 2023-01-31 | End: 2023-02-01

## 2023-01-31 RX ORDER — ACETAMINOPHEN 500 MG
1000 TABLET ORAL EVERY 8 HOURS
Refills: 0 | Status: DISCONTINUED | OUTPATIENT
Start: 2023-02-01 | End: 2023-02-01

## 2023-01-31 RX ORDER — POLYETHYLENE GLYCOL 3350 17 G/17G
17 POWDER, FOR SOLUTION ORAL AT BEDTIME
Refills: 0 | Status: DISCONTINUED | OUTPATIENT
Start: 2023-01-31 | End: 2023-02-01

## 2023-01-31 RX ORDER — DIPHENHYDRAMINE HCL 50 MG
25 CAPSULE ORAL ONCE
Refills: 0 | Status: COMPLETED | OUTPATIENT
Start: 2023-01-31 | End: 2023-01-31

## 2023-01-31 RX ORDER — CEFAZOLIN SODIUM 1 G
2000 VIAL (EA) INJECTION EVERY 8 HOURS
Refills: 0 | Status: COMPLETED | OUTPATIENT
Start: 2023-01-31 | End: 2023-02-01

## 2023-01-31 RX ORDER — ACETAMINOPHEN 500 MG
1000 TABLET ORAL EVERY 6 HOURS
Refills: 0 | Status: COMPLETED | OUTPATIENT
Start: 2023-01-31 | End: 2023-01-31

## 2023-01-31 RX ORDER — LABETALOL HCL 100 MG
200 TABLET ORAL DAILY
Refills: 0 | Status: DISCONTINUED | OUTPATIENT
Start: 2023-01-31 | End: 2023-02-01

## 2023-01-31 RX ORDER — CEFAZOLIN SODIUM 1 G
2000 VIAL (EA) INJECTION ONCE
Refills: 0 | Status: COMPLETED | OUTPATIENT
Start: 2023-01-31 | End: 2023-01-31

## 2023-01-31 RX ORDER — SENNA PLUS 8.6 MG/1
2 TABLET ORAL AT BEDTIME
Refills: 0 | Status: DISCONTINUED | OUTPATIENT
Start: 2023-01-31 | End: 2023-02-01

## 2023-01-31 RX ADMIN — FAMOTIDINE 40 MILLIGRAM(S): 10 INJECTION INTRAVENOUS at 21:28

## 2023-01-31 RX ADMIN — Medication 1000 MILLIGRAM(S): at 18:59

## 2023-01-31 RX ADMIN — SODIUM CHLORIDE 500 MILLILITER(S): 9 INJECTION INTRAMUSCULAR; INTRAVENOUS; SUBCUTANEOUS at 18:32

## 2023-01-31 RX ADMIN — Medication 25 MILLIGRAM(S): at 12:57

## 2023-01-31 RX ADMIN — OXYCODONE HYDROCHLORIDE 5 MILLIGRAM(S): 5 TABLET ORAL at 18:02

## 2023-01-31 RX ADMIN — Medication 100 MILLIGRAM(S): at 22:32

## 2023-01-31 RX ADMIN — SODIUM CHLORIDE 100 MILLILITER(S): 9 INJECTION, SOLUTION INTRAVENOUS at 18:33

## 2023-01-31 RX ADMIN — OXYCODONE HYDROCHLORIDE 10 MILLIGRAM(S): 5 TABLET ORAL at 22:20

## 2023-01-31 RX ADMIN — SENNA PLUS 2 TABLET(S): 8.6 TABLET ORAL at 22:32

## 2023-01-31 RX ADMIN — Medication 100 MILLIGRAM(S): at 17:43

## 2023-01-31 RX ADMIN — APREPITANT 40 MILLIGRAM(S): 80 CAPSULE ORAL at 07:42

## 2023-01-31 RX ADMIN — Medication 400 MILLIGRAM(S): at 23:33

## 2023-01-31 RX ADMIN — OXYCODONE HYDROCHLORIDE 10 MILLIGRAM(S): 5 TABLET ORAL at 21:26

## 2023-01-31 RX ADMIN — POLYETHYLENE GLYCOL 3350 17 GRAM(S): 17 POWDER, FOR SOLUTION ORAL at 22:32

## 2023-01-31 RX ADMIN — SODIUM CHLORIDE 75 MILLILITER(S): 9 INJECTION, SOLUTION INTRAVENOUS at 13:00

## 2023-01-31 RX ADMIN — CHLORHEXIDINE GLUCONATE 1 APPLICATION(S): 213 SOLUTION TOPICAL at 07:43

## 2023-01-31 RX ADMIN — Medication 400 MILLIGRAM(S): at 17:43

## 2023-01-31 RX ADMIN — Medication 1000 MILLIGRAM(S): at 23:58

## 2023-01-31 RX ADMIN — SODIUM CHLORIDE 500 MILLILITER(S): 9 INJECTION INTRAMUSCULAR; INTRAVENOUS; SUBCUTANEOUS at 13:24

## 2023-01-31 RX ADMIN — ATORVASTATIN CALCIUM 40 MILLIGRAM(S): 80 TABLET, FILM COATED ORAL at 22:32

## 2023-01-31 RX ADMIN — OXYCODONE HYDROCHLORIDE 5 MILLIGRAM(S): 5 TABLET ORAL at 18:32

## 2023-01-31 NOTE — PHYSICAL THERAPY INITIAL EVALUATION ADULT - ADDITIONAL COMMENTS
Pt lives with  in a private home, there are no stairs to enter and 8+8 stairs +HR to the primary bedroom/bathroom. Pt has a walk in shower. PTA pt was independent with ADLs and ambulation.

## 2023-01-31 NOTE — CONSULT NOTE ADULT - SUBJECTIVE AND OBJECTIVE BOX
Patient is a 77y old  Female who presents with a chief complaint of s/p right TKR (31 Jan 2023 15:55)      HPI: 76 yo female reports right knee pain and swelling for >4 years rating 10/10 when she is weight bearing.  She has tried HA and cortisone injections without relief.  Now s/p right knee replacement.      REVIEW OF SYSTEMS:  CONSTITUTIONAL: No fever, weight loss, or fatigue  EYES: No eye pain, visual disturbances, or discharge  ENMT:  No difficulty hearing, tinnitus, vertigo; No sinus or throat pain  NECK: No pain or stiffness  BREASTS: No pain, masses, or nipple discharge  RESPIRATORY: No cough, wheezing, chills or hemoptysis; No shortness of breath  CARDIOVASCULAR: No chest pain, palpitations, dizziness, or leg swelling  GASTROINTESTINAL: No abdominal or epigastric pain. No nausea, vomiting, or hematemesis; No diarrhea or constipation. No melena or hematochezia.  GENITOURINARY: No dysuria, frequency, hematuria, or incontinence  NEUROLOGICAL: No headaches, memory loss, loss of strength, numbness, or tremors  SKIN: No itching, burning, rashes, or lesions   LYMPH NODES: No enlarged glands  ENDOCRINE: No heat or cold intolerance; No hair loss  MUSCULOSKELETAL: No muscle or back pain  PSYCHIATRIC: No depression, anxiety, mood swings, or difficulty sleeping  HEME/LYMPH: No easy bruising, or bleeding gums  ALLERGY AND IMMUNOLOGIC: No hives or eczema    PAST MEDICAL & SURGICAL HISTORY:  Hypertension    Hyperlipidemia    Osteoarthritis    Allergic rhinitis    Osteoarthritis of right knee    Varicose veins    GERD (gastroesophageal reflux disease)    Chronic constipation    Class 1 obesity with body mass index (BMI) of 33.0 to 33.9 in adult    S/P breast biopsy, left  1976    S/P hysterectomy with oophorectomy  1995    S/P bladder repair  reconstruction 1998/2000    prolapsed bladder    History of esophageal surgery      SOCIAL HISTORY:  Residence: [ ] [facility]  [ ] [facility]  [ ] Community  [ ] Substance abuse: denies  [ ] Tobacco: denies  [ ] Alcohol use: denies    Allergies  latex (Hives)  No Known Drug Allergies      MEDICATIONS  (STANDING):  acetaminophen     Tablet .. 1000 milliGRAM(s) Oral every 8 hours  acetaminophen   IVPB .. 1000 milliGRAM(s) IV Intermittent every 6 hours  atorvastatin 40 milliGRAM(s) Oral at bedtime  ceFAZolin   IVPB 2000 milliGRAM(s) IV Intermittent every 8 hours  celecoxib 200 milliGRAM(s) Oral every 12 hours  labetalol 100 milliGRAM(s) Oral at bedtime  labetalol 200 milliGRAM(s) Oral daily  lactated ringers. 1000 milliLiter(s) (100 mL/Hr) IV Continuous <Continuous>  montelukast 10 milliGRAM(s) Oral daily  pantoprazole    Tablet 40 milliGRAM(s) Oral before breakfast  polyethylene glycol 3350 17 Gram(s) Oral at bedtime  senna 2 Tablet(s) Oral at bedtime  sodium chloride 0.9% Bolus 500 milliLiter(s) IV Bolus once    MEDICATIONS  (PRN):  HYDROmorphone  Injectable 0.5 milliGRAM(s) IV Push every 3 hours PRN breakthrough  magnesium hydroxide Suspension 30 milliLiter(s) Oral daily PRN Constipation  ondansetron Injectable 4 milliGRAM(s) IV Push every 6 hours PRN Nausea and/or Vomiting  oxyCODONE    IR 5 milliGRAM(s) Oral every 3 hours PRN Moderate Pain (4 - 6)  oxyCODONE    IR 10 milliGRAM(s) Oral every 3 hours PRN Severe Pain (7 - 10)      FAMILY HISTORY:  Family history of breast cancer (Mother)  Family history of COPD (chronic obstructive pulmonary disease) (Father)  Family history of glaucoma in brother (Sibling)        Vital Signs Last 24 Hrs  T(C): 36.7 (31 Jan 2023 15:45), Max: 36.7 (31 Jan 2023 15:45)  T(F): 98 (31 Jan 2023 15:45), Max: 98 (31 Jan 2023 15:45)  HR: 68 (31 Jan 2023 15:45) (54 - 89)  BP: 147/64 (31 Jan 2023 15:45) (96/74 - 147/64)  BP(mean): --  RR: 18 (31 Jan 2023 15:45) (15 - 24)  SpO2: 98% (31 Jan 2023 15:45) (96% - 100%)    Parameters below as of 31 Jan 2023 15:45  Patient On (Oxygen Delivery Method): room air        PHYSICAL EXAM:    GENERAL: NAD, well-groomed, well-developed  HEAD:  Atraumatic, Normocephalic  EYES:  conjunctiva and sclera clear  ENMT: Moist mucous membranes  NECK: Supple, No JVD  NERVOUS SYSTEM:  Alert & Oriented X3, Good concentration; Moving all 4 extremities; No gross sensory deficits  CHEST/LUNG: Clear to auscultation bilaterally;   HEART: Regular rate and rhythm;   ABDOMEN: Soft, Nontender, Nondistended; Bowel sounds present  EXTREMITIES:  2+ Peripheral Pulses, No clubbing, cyanosis, or edema  LYMPH: No lymphadenopathy noted  /RECTAL: Not examined  BREAST: Not examined  SKIN: No rashes or lesions  INCISION: dressing dry and intact    LABS:        CAPILLARY BLOOD GLUCOSE          RADIOLOGY & ADDITIONAL STUDIES:    EKG: sinus bradycardia  Personally Reviewed:  [ x] YES     Imaging:  TKR  Personally Reviewed:  [x ] YES     Consultant(s) Notes Reviewed:  pre-op med and cardiac evals appreciated.     Care Discussed with Consultants/Other Providers:

## 2023-01-31 NOTE — PHYSICAL THERAPY INITIAL EVALUATION ADULT - RANGE OF MOTION EXAMINATION, REHAB EVAL
right knee not assessed due to surgery/bilateral upper extremity ROM was WFL (within functional limits)/bilateral lower extremity ROM was WFL (within functional limits)

## 2023-01-31 NOTE — CARE COORDINATION ASSESSMENT. - NSDCPLANSERVICES_GEN_ALL_CORE
Patient s/p TRK replacement 1/31/2023. CM met patient and  at bedside and explained role, stated understanding. Patient lives in a private house with  with 8 steps up and 8 steps down in split level. Patient has rolling walker/commode/coronado at home. Denies Prior home care, Resource information provided and referral sent accordingly as patient choose [facility] care. Patients  is at bedside and will be available to transport home and help with adls as needed.   PCP: Nuria Reyez [phone]  [employer] will deliver to bedside.  Anticipated d/c 2/1/2023/Home Care

## 2023-01-31 NOTE — PATIENT PROFILE ADULT - FALL HARM RISK - UNIVERSAL INTERVENTIONS
Bed in lowest position, wheels locked, appropriate side rails in place/Call bell, personal items and telephone in reach/Instruct patient to call for assistance before getting out of bed or chair/Non-slip footwear when patient is out of bed/[place] to call system/Physically safe environment - no spills, clutter or unnecessary equipment/Purposeful Proactive Rounding/Room/bathroom lighting operational, light cord in reach

## 2023-01-31 NOTE — PATIENT CHOICE NOTE. - NSPTCHOICESTATE_GEN_ALL_CORE

## 2023-01-31 NOTE — OCCUPATIONAL THERAPY INITIAL EVALUATION ADULT - DIAGNOSIS, OT EVAL
Pt with impaired ROM, strength, balance, sensation, motor control impacting pt's ability to complete ADLs, IADLs, functional mobility/transfers.

## 2023-01-31 NOTE — DISCHARGE NOTE NURSING/CASE MANAGEMENT/SOCIAL WORK - NSSCNAMETXT_GEN_ALL_CORE
[facility] care agency [phone] will reach out to you within 24-72 hours of your discharge to schedule home care visit/eval appointment with you. Please call agency for any queries regarding home care services

## 2023-01-31 NOTE — DISCHARGE NOTE PROVIDER - NSDCMRMEDTOKEN_GEN_ALL_CORE_FT
Benicar 40 mg oral tablet: 1 tab(s) orally once a day  Crestor 10 mg oral tablet: 1 tab(s) orally once a day (at bedtime)  famotidine 40 mg oral tablet: 1 tab(s) orally once a day (at bedtime)  hydroCHLOROthiazide 12.5 mg oral capsule: 1 cap(s) orally once a day  labetalol 100 mg oral tablet: orally once a day (at bedtime)  labetalol 200 mg oral tablet: orally once a day  MiraLax oral powder for reconstitution: orally once a day  pantoprazole 40 mg oral delayed release tablet: 1 tab(s) orally once a day  Singulair 10 mg oral tablet: 1 tab(s) orally once a day   acetaminophen 500 mg oral tablet: 2 tab(s) orally every 8 hours  apixaban 2.5 mg oral tablet: 1 tab(s) orally every 12 hours for 14 days after surgery then switch to aspirin 81mg twice daily for 2 weeks  Benicar 40 mg oral tablet: 1 tab(s) orally once a day  Crestor 10 mg oral tablet: 1 tab(s) orally once a day (at bedtime)  famotidine 40 mg oral tablet: 1 tab(s) orally once a day (at bedtime)  hydroCHLOROthiazide 12.5 mg oral capsule: 1 cap(s) orally once a day  labetalol 100 mg oral tablet: orally once a day (at bedtime)  labetalol 200 mg oral tablet: orally once a day  MiraLax oral powder for reconstitution: orally once a day  oxyCODONE 5 mg oral tablet: 1-2 tab(s) orally every 4 hours, As Needed -Moderate Pain (4 - 6) MDD:12  pantoprazole 40 mg oral delayed release tablet: 1 tab(s) orally once a day  senna leaf extract oral tablet: 2 tab(s) orally once a day (at bedtime)  Singulair 10 mg oral tablet: 1 tab(s) orally once a day   acetaminophen 500 mg oral tablet: 2 tab(s) orally every 8 hours  apixaban 2.5 mg oral tablet: 1 tab(s) orally every 12 hours for 14 days after surgery then switch to aspirin 81mg twice daily for 2 weeks  Benicar 40 mg oral tablet: 1 tab(s) orally once a day  CeleBREX 100 mg oral capsule: 1 cap(s) orally every 12 hours   Crestor 10 mg oral tablet: 1 tab(s) orally once a day (at bedtime)  famotidine 40 mg oral tablet: 1 tab(s) orally once a day (at bedtime)  hydroCHLOROthiazide 12.5 mg oral capsule: 1 cap(s) orally once a day  labetalol 100 mg oral tablet: orally once a day (at bedtime)  labetalol 200 mg oral tablet: orally once a day  MiraLax oral powder for reconstitution: orally once a day  oxyCODONE 5 mg oral tablet: 1-2 tab(s) orally every 4 hours, As Needed -Moderate Pain (4 - 6) MDD:12  pantoprazole 40 mg oral delayed release tablet: 1 tab(s) orally once a day  senna leaf extract oral tablet: 2 tab(s) orally once a day (at bedtime)  Singulair 10 mg oral tablet: 1 tab(s) orally once a day   acetaminophen 500 mg oral tablet: 2 tab(s) orally every 8 hours  apixaban 2.5 mg oral tablet: 1 tab(s) orally every 12 hours for 14 days after surgery then switch to aspirin 81mg twice daily for 2 weeks  Aspirin Enteric Coated 81 mg oral delayed release tablet: 1 tab(s) orally every 12 hours for 2 weeks.  Begin after completing Eliquis  Benicar 40 mg oral tablet: 1 tab(s) orally once a day  CeleBREX 100 mg oral capsule: 1 cap(s) orally every 12 hours   Crestor 10 mg oral tablet: 1 tab(s) orally once a day (at bedtime)  famotidine 40 mg oral tablet: 1 tab(s) orally once a day (at bedtime)  hydroCHLOROthiazide 12.5 mg oral capsule: 1 cap(s) orally once a day  labetalol 100 mg oral tablet: orally once a day (at bedtime)  labetalol 200 mg oral tablet: orally once a day  MiraLax oral powder for reconstitution: orally once a day  oxyCODONE 5 mg oral tablet: 1  tab(s) orally every 4 hours, As Needed -Moderate Pain (4 - 6) MDD:6  pantoprazole 40 mg oral delayed release tablet: 1 tab(s) orally once a day  senna leaf extract oral tablet: 2 tab(s) orally once a day (at bedtime)  Singulair 10 mg oral tablet: 1 tab(s) orally once a day

## 2023-01-31 NOTE — DISCHARGE NOTE PROVIDER - NSDCCPCAREPLAN_GEN_ALL_CORE_FT
PRINCIPAL DISCHARGE DIAGNOSIS  Diagnosis: Primary localized osteoarthritis of right knee  Assessment and Plan of Treatment: Physical Therapy/Occupational Therapy for: ambulation, transfers, stairs, ADL's (activities of daily living), and range of motion exercises  -Activity  • Weight Bearing as tolerated with rolling walker.  • Take short, frequent walks increasing the distance that you walk each day as tolerated.  • Change your position every hour to decrease pain and stiffness.  • Continue the exercises taught to you by your physical therapist.  • No driving until cleared by the doctor.  • No tub baths, hot tubs, or swimming pools until instructed by your doctor.  • Do not squat down on the floor.  • Do not kneel or twist your knee.  • Range of Motion Goals: Flexion= 120 degrees, Extension = 0 degrees  You have a Mepilex dressing. You may shower. Mepilex dressing is water resistant, not waterproof. Do not aim shower stream at surgical site.  Pat dry after showering.  Remove Mepilex dressing in 1 week.  Keep incision clean. DO NOT APPLY ANYTHING to incision site (salves/ointments/creams). Do not scrub incision site. Pat dry after shower.  Prineo removal 2 weeks after surgery at Surgeon's office.  Apply cryocuff to operative knee for 20 minutes at a time, several times a day, with at least a 1 hour break in between sessions.  Follow up with your primary care physician within 2-3 weeks of discharge home

## 2023-01-31 NOTE — DISCHARGE NOTE PROVIDER - NSDCFUADDINST_GEN_ALL_CORE_FT
Pain medicine has been prescribed for you, as needed, and it often causes constipation.  Take docusate sodium (Colace) 100mg 3x daily, while taking narcotic pain medication.   For Constipation :   • Increase your water intake. Drink at least 8 glasses of water daily.  • Try adding fiber to your diet by eating fruits, vegetables and foods that are rich in grains.  • If you do experience constipation, you may take an over-the-counter laxative such as, Senokot, Miralax or  Milk of Magnesia.    Call your doctor if you experience:  • An increase in pain not controlled by pain medication or change in activity or  position.  • Temperature greater than 101° F.  • Redness, increased swelling or foul smelling drainage from or around the  incision.  • Numbness, tingling or a change in color or temperature of the operative site.  • Call your doctor immediately if you experience chest pain, shortness of breath or calf pain.

## 2023-01-31 NOTE — PATIENT PROFILE ADULT - PATIENT'S GENDER IDENTITY
Chief complaint:   Chief Complaint   Patient presents with   • Follow-up      6 MTH FUV MED       Vitals:  Visit Vitals  /80 (BP Location: [facility] [place], Patient Position: Sitting, Cuff Size: Large Adult)   Pulse 62   Temp 97.8 °F (36.6 °C) (Temporal Artery)   Resp 14   Ht 5' 10\" (1.778 m)   Wt 134.3 kg   SpO2 92%   BMI 42.47 kg/m²       HISTORY OF PRESENT ILLNESS     Eladio is a 79 year old male who presents today for follow up of his hypertension, gout, OA and hyperlipidemia.  His blood pressure is under good control.  His last lipid testing revealed good control. He has had no gout flares.  He notes no side effects from his medications.    Diet:  fair  Exercise:  active but does not specifically exercise    Wt Readings from Last 4 Encounters:  05/30/18 : 134.3 kg  05/08/18 : 135.6 kg  04/30/18 : 134.5 kg  01/30/18 : 133.8 kg      His most recent labs are as follows:    CHOLESTEROL       Date                     Value                 05/16/2018               150 mg/dL             05/03/2017               143 MG/DL        ----------  CALCULATED LDL       Date                     Value                 05/16/2018               65 mg/dL              05/03/2017               53 MG/DL         ----------  HDL       Date                     Value                 05/16/2018               64 mg/dL              05/03/2017               67 MG/DL (H)     ----------  TRIGLYCERIDE       Date                     Value                 05/16/2018               106 mg/dL             05/03/2017               115 MG/DL        ----------  Glucose (mg/dL)       Date                     Value                 05/16/2018               105 (H)               05/08/2018               106 (H)          ----------  Creatinine (mg/dL)       Date                     Value                 05/16/2018               0.77             ----------  Sodium (mmol/L)       Date                     Value                 05/16/2018               141               ----------  Potassium (mmol/L)       Date                     Value                 05/16/2018               4.1              ----------  ALT/SGPT (Units/L)       Date                     Value                 05/16/2018               34               ----------  AST/SGOT (Units/L)       Date                     Value                 05/16/2018               38 (H)           ----------  TOTAL BILIRUBIN (mg/dL)       Date                     Value                 05/16/2018               0.4              ----------        Other significant problems:  Patient Active Problem List    Diagnosis Date Noted   • Anticoagulation adequate:  Xarelto 05/08/2018     Priority: Low   • Elevated brain natriuretic peptide (BNP) level 05/08/2018     Priority: Low   • REYES (dyspnea on exertion) 05/08/2018     Priority: Low   • Palpitations 05/08/2018     Priority: Low   • Dilated aortic root (CMS/HCC) 05/08/2018     Priority: Low   • Body mass index (BMI) 40.0-44.9, adult (CMS/HCC) 04/03/2018     Priority: Low   • Glaucoma suspect      Priority: Low   • Cataract, nuclear sclerotic, both eyes      Priority: Low   • Dermatochalasis      Priority: Low   • Osteoarthrosis, generalized, involving multiple sites 11/16/2015     Priority: Low   • Personal history of colonic polyps      Priority: Low     Adenomatous polyps 5/2009     • Family history of colon cancer      Priority: Low     Mother dx in her 80's     • Essential hypertension 04/29/2013     Priority: Low   • Gout 04/29/2013     Priority: Low   • Hyperlipidemia 04/29/2013     Priority: Low       PAST MEDICAL, FAMILY AND SOCIAL HISTORY     Medications:  Current Outpatient Prescriptions   Medication   • aspirin 81 MG tablet   • hydrochlorothiazide (HYDRODIURIL) 25 MG tablet   • furosemide (LASIX) 20 MG tablet   • naproxen sodium (ALEVE) 220 MG tablet   • rivaroxaban (XARELTO) 20 MG Tab   • dilTIAZem (DILACOR XR) 240 MG 24 hr capsule   • atorvastatin (LIPITOR) 20 MG tablet   •  fenofibrate 160 MG tablet   • Glucosamine-Chondroitin (OSTEO BI-FLEX REGULAR STRENGTH PO)   • Omega-3 Fatty Acids (OMEGA 3 PO)   • allopurinol (ZYLOPRIM) 300 MG tablet     No current facility-administered medications for this visit.        Allergies:  ALLERGIES:  No Known Allergies    Past Medical  History/Surgeries:  Past Medical History:   Diagnosis Date   • AAA (abdominal aortic aneurysm) (CMS/HCC)    • Atrial fibrillation (CMS/HCC)    • Atrial fibrillation (CMS/HCC)    • Cataract, nuclear sclerotic, both eyes    • CTS (carpal tunnel syndrome)     bilateral   • Dermatochalasis    • Elbow fracture, right    • Family history of colon cancer     Mother dx in her 80's   • Glaucoma suspect    • Gout    • HTN (hypertension)    • Hyperlipidemia    • Metabolic syndrome    • Obesity    • Osteoarthritis     knees bilaterally   • Osteopenia    • Personal history of colonic polyps     Adenomatous polyps 5/2009   • Right wrist fracture        Past Surgical History:   Procedure Laterality Date   • Basal cell carcinoma excision N/A 04/18/2017    left lower lip removed by [facility] surgery at [facility]   • Carpal tunnel release      Carpal Tunnel; bilateral   • Colonoscopy w/ biopsies and polypectomy  05/07/2009    DUE 5/2014   • Excision of lingual tonsil     • Service to gastroenterology  6.11.14    Colonoscopy. Dr. Alonzo. Repeat 6-2019.   • Total knee replacement  01/17/2006    Knee replacement       Family History:  Family History   Problem Relation Age of Onset   • Cancer, Colon Mother 80   • Aneurysm Mother    • Cancer Father         \"something with his stomach\"   • Heart disease Father         atrial fib   • Heart disease Sister         atrial fib   • Arthritis Brother        Social History:  Social History   Substance Use Topics   • Smoking status: Former Smoker     Types: Cigarettes     Quit date: 4/1/1989   • Smokeless tobacco: Former User     Quit date: 4/1/1989   • Alcohol use 6.0 oz/week     10 Standard drinks or  equivalent per week       REVIEW OF SYSTEMS     Review of Systems   Constitutional: Negative for fatigue.   Respiratory: Negative for chest tightness and shortness of breath.    Cardiovascular: Negative for chest pain, palpitations and leg swelling.       PHYSICAL EXAM     Physical Exam   Constitutional: He is oriented to person, place, and time. He appears well-developed and well-nourished. No distress.   HENT:   Mouth/Throat: Oropharynx is clear and moist. No oropharyngeal exudate.   Neck: Neck supple.   Cardiovascular: Normal rate and regular rhythm.    Pulmonary/Chest: Effort normal and breath sounds normal.   Lymphadenopathy:     He has no cervical adenopathy.   Neurological: He is alert and oriented to person, place, and time.   Skin: Skin is warm and dry.   Psychiatric: He has a normal mood and affect. His behavior is normal.       ASSESSMENT/PLAN     1. Essential hypertension    2. Osteoarthrosis, generalized, involving multiple sites    3. Mixed hyperlipidemia    4. Idiopathic gout, unspecified chronicity, unspecified site    5. Need for vaccination      Continue same plan. Dtap updated today per request. Recommend that he work on diet/exercise and weight reduction. Medicare wellness in 6 months.    Female

## 2023-01-31 NOTE — DISCHARGE NOTE NURSING/CASE MANAGEMENT/SOCIAL WORK - NSDCPEPTCAREGIVEDUMATLIST _GEN_ALL_CORE
What Type Of Note Output Would You Prefer (Optional)?: Standard Output What Is The Reason For Today's Visit?: Full Body Skin Examination What Is The Reason For Today's Visit? (Being Monitored For X): concerning skin lesions on an annual basis How Severe Are Your Spot(S)?: mild Apixaban/Eliquis

## 2023-01-31 NOTE — CARE COORDINATION ASSESSMENT. - NSPASTMEDSURGHISTORY_GEN_ALL_CORE_FT
PAST MEDICAL & SURGICAL HISTORY:  Allergic rhinitis      Osteoarthritis      Hyperlipidemia      Hypertension      S/P bladder repair  reconstruction 1998/2000    prolapsed bladder      S/P hysterectomy with oophorectomy  1995      S/P breast biopsy, left  1976      Class 1 obesity with body mass index (BMI) of 33.0 to 33.9 in adult      Chronic constipation      GERD (gastroesophageal reflux disease)      Varicose veins      Osteoarthritis of right knee      History of esophageal surgery

## 2023-01-31 NOTE — DISCHARGE NOTE PROVIDER - CARE PROVIDER_API CALL
Fracisco Lopez)  Orthopaedic Surgery  [address], [address]  [place] [address]  Phone: [phone]  Fax: [phone]  Scheduled Appointment: 02/17/2023 09:45 AM

## 2023-01-31 NOTE — PHYSICAL THERAPY INITIAL EVALUATION ADULT - TRANSFER TRAINING, PT EVAL
colonoscopy- none Patient will perform transfers independently with a rolling walker within 1-2 days to promote safety and reduce risk of falls.

## 2023-01-31 NOTE — DISCHARGE NOTE NURSING/CASE MANAGEMENT/SOCIAL WORK - PATIENT PORTAL LINK FT
You can access the FollowMyHealth Patient Portal offered by [facility] [place] by registering at the following website: [email or web address]. By joining [employer]’s FollowMyHealth portal, you will also be able to view your health information using other applications (apps) compatible with our system.

## 2023-01-31 NOTE — DISCHARGE NOTE PROVIDER - NSDCFUSCHEDAPPT_GEN_ALL_CORE_FT
Fracisco Lopez  [facility] Physician [facility]  ORTHOSURG [address] [place] [address]  Scheduled Appointment: 02/17/2023

## 2023-01-31 NOTE — CARE COORDINATION ASSESSMENT. - NSCAREPROVIDERS_GEN_ALL_CORE_FT
CARE PROVIDERS:  Administration: Jayda Preciado  Admitting: Fracisco Lopez  Attending: Fracisco Lopez  Case Management: Candi Box  Consultant: Bret Cruz  Consultant: Luna Melchor  Nurse: Pilar Cordero  Nurse: Arielle Massey  Nurse: Srinivasan Tena  Nurse: Keiko Mackenzie  Nurse: Odette Mccollum  Nurse: Sherri Amato  Nurse: Cee Chapa  Occupational Therapy: Viry Main  Override: Sheila Chang  Physical Therapy: Nazanin Crook  Primary Team: Cyn Reyna  Primary Team: Meryl Khalil  Primary Team: Colt Mendiola  Primary Team: Moose Goss  Primary Team: Ally Angulo  Primary Team: Johnson Elmore  Primary Team: Eric Marroquin  Primary Team: Berto Irizarry  Primary Team: Mateusz Nassar  Research: Johnson Doty  Respiratory Therapy: Benoit Mills  : Pascale Lozada  Team: KYMBERLY  Hospitalists, Team  UR// Supp. Assoc.: Jo Velasquez

## 2023-01-31 NOTE — OCCUPATIONAL THERAPY INITIAL EVALUATION ADULT - LIVES WITH, PROFILE
Pt lives with her  in a split level private home, 0 steps to enter, 8+8 steps inside with a walk in shower. Pt was independent with ADLs, IADLs, functional mobility/transfers, prior to admission./spouse

## 2023-01-31 NOTE — CONSULT NOTE ADULT - ASSESSMENT
77F s/p right TKR    Right knee osteoarthritis s/p TKR  Pain Management: acceptable- continue current care Tylenol ATC/Celebrex ATC/ Oxycodone PRN  Continue PT/OT  DVT proph: [  ] low risk - Aspirin  [  ] high risk -Lovenox [ x ] high risk - Eliquis [  ] other:_________  DC plan:  [x  ] Home with HC  [  ] Rehab   [  ] TBD  [  ]other:___________    HTN  continue labetolol with hold parameters  continue ARB with hold parameters  hold diuretic for now    HLD  Continue statin    Allergies  can continue singulair if still taking it.

## 2023-01-31 NOTE — DISCHARGE NOTE PROVIDER - HOSPITAL COURSE
This patient is a 77y.o.  female with severe osteoarthritis of the right knee.  After admission on 1/31/22 and receiving pre-operative parenteral prophylactic antibiotics, the patient  underwent an  uncomplicated right total knee replacement by Dr. Lopez.    A medical consultation from the Hospitalist service was obtained for post-operative medical co-management.   Typical Physical & occupational therapy modalities post total knee replacement were performed including ambulation training, range of motion, ADL's, and transfers.  Eliquis was given for DVT prophylaxis.  The patient had a clean appearing surgical incision with no sign of surgical site infections and had a stable neuro / vascular exam of the operated extremity.     Discharge and Orthopedic Care instructions were delineated in the Discharge Plan and reviewed with the patient.   All medications were delineated in the medication reconciliation tool and key points were reviewed with the patient.   The patient was deemed stable from an Orthopedic & medical standpoint for discharge today.  She will  follow up with Dr. Lopez for further orthopedic care upon completion of Home care PT.

## 2023-02-01 VITALS
HEART RATE: 66 BPM | SYSTOLIC BLOOD PRESSURE: 108 MMHG | TEMPERATURE: 98 F | OXYGEN SATURATION: 97 % | DIASTOLIC BLOOD PRESSURE: 68 MMHG | RESPIRATION RATE: 20 BRPM

## 2023-02-01 LAB
ANION GAP SERPL CALC-SCNC: 5 MMOL/L — SIGNIFICANT CHANGE UP (ref 5–17)
BUN SERPL-MCNC: 10 MG/DL — SIGNIFICANT CHANGE UP (ref 7–23)
CALCIUM SERPL-MCNC: 8.7 MG/DL — SIGNIFICANT CHANGE UP (ref 8.4–10.5)
CHLORIDE SERPL-SCNC: 107 MMOL/L — SIGNIFICANT CHANGE UP (ref 96–108)
CO2 SERPL-SCNC: 26 MMOL/L — SIGNIFICANT CHANGE UP (ref 22–31)
CREAT SERPL-MCNC: 0.66 MG/DL — SIGNIFICANT CHANGE UP (ref 0.5–1.3)
EGFR: 91 ML/MIN/1.73M2 — SIGNIFICANT CHANGE UP
GLUCOSE SERPL-MCNC: 117 MG/DL — HIGH (ref 70–99)
HCT VFR BLD CALC: 33.9 % — LOW (ref 34.5–45)
HGB BLD-MCNC: 11 G/DL — LOW (ref 11.5–15.5)
MCHC RBC-ENTMCNC: 29.8 PG — SIGNIFICANT CHANGE UP (ref 27–34)
MCHC RBC-ENTMCNC: 32.4 GM/DL — SIGNIFICANT CHANGE UP (ref 32–36)
MCV RBC AUTO: 91.9 FL — SIGNIFICANT CHANGE UP (ref 80–100)
NRBC # BLD: 0 /100 WBCS — SIGNIFICANT CHANGE UP (ref 0–0)
PLATELET # BLD AUTO: 207 K/UL — SIGNIFICANT CHANGE UP (ref 150–400)
POTASSIUM SERPL-MCNC: 5.1 MMOL/L — SIGNIFICANT CHANGE UP (ref 3.5–5.3)
POTASSIUM SERPL-SCNC: 5.1 MMOL/L — SIGNIFICANT CHANGE UP (ref 3.5–5.3)
RBC # BLD: 3.69 M/UL — LOW (ref 3.8–5.2)
RBC # FLD: 13.6 % — SIGNIFICANT CHANGE UP (ref 10.3–14.5)
SODIUM SERPL-SCNC: 138 MMOL/L — SIGNIFICANT CHANGE UP (ref 135–145)
WBC # BLD: 14.45 K/UL — HIGH (ref 3.8–10.5)
WBC # FLD AUTO: 14.45 K/UL — HIGH (ref 3.8–10.5)

## 2023-02-01 PROCEDURE — C1776: CPT

## 2023-02-01 PROCEDURE — 27447 TOTAL KNEE ARTHROPLASTY: CPT

## 2023-02-01 PROCEDURE — 97165 OT EVAL LOW COMPLEX 30 MIN: CPT

## 2023-02-01 PROCEDURE — 99232 SBSQ HOSP IP/OBS MODERATE 35: CPT

## 2023-02-01 PROCEDURE — 80048 BASIC METABOLIC PNL TOTAL CA: CPT

## 2023-02-01 PROCEDURE — C1713: CPT

## 2023-02-01 PROCEDURE — 97530 THERAPEUTIC ACTIVITIES: CPT

## 2023-02-01 PROCEDURE — 97116 GAIT TRAINING THERAPY: CPT

## 2023-02-01 PROCEDURE — 36415 COLL VENOUS BLD VENIPUNCTURE: CPT

## 2023-02-01 PROCEDURE — 97161 PT EVAL LOW COMPLEX 20 MIN: CPT

## 2023-02-01 PROCEDURE — 97110 THERAPEUTIC EXERCISES: CPT

## 2023-02-01 PROCEDURE — 73560 X-RAY EXAM OF KNEE 1 OR 2: CPT

## 2023-02-01 PROCEDURE — 97535 SELF CARE MNGMENT TRAINING: CPT

## 2023-02-01 PROCEDURE — 85027 COMPLETE CBC AUTOMATED: CPT

## 2023-02-01 RX ORDER — ASPIRIN/CALCIUM CARB/MAGNESIUM 324 MG
1 TABLET ORAL
Qty: 28 | Refills: 0
Start: 2023-02-01 | End: 2023-02-14

## 2023-02-01 RX ORDER — SENNA PLUS 8.6 MG/1
2 TABLET ORAL
Qty: 0 | Refills: 0 | DISCHARGE
Start: 2023-02-01

## 2023-02-01 RX ORDER — CELECOXIB 200 MG/1
1 CAPSULE ORAL
Qty: 28 | Refills: 0
Start: 2023-02-01 | End: 2023-02-14

## 2023-02-01 RX ORDER — OXYCODONE HYDROCHLORIDE 5 MG/1
1 TABLET ORAL
Qty: 42 | Refills: 0
Start: 2023-02-01 | End: 2023-02-07

## 2023-02-01 RX ORDER — APIXABAN 2.5 MG/1
1 TABLET, FILM COATED ORAL
Qty: 26 | Refills: 0
Start: 2023-02-01 | End: 2023-02-13

## 2023-02-01 RX ORDER — APIXABAN 2.5 MG/1
1 TABLET, FILM COATED ORAL
Qty: 28 | Refills: 0
Start: 2023-02-01 | End: 2023-02-13

## 2023-02-01 RX ORDER — ACETAMINOPHEN 500 MG
2 TABLET ORAL
Qty: 0 | Refills: 0 | DISCHARGE
Start: 2023-02-01

## 2023-02-01 RX ORDER — CELECOXIB 200 MG/1
1 CAPSULE ORAL
Qty: 60 | Refills: 0
Start: 2023-02-01 | End: 2023-03-02

## 2023-02-01 RX ADMIN — Medication 1000 MILLIGRAM(S): at 05:47

## 2023-02-01 RX ADMIN — Medication 200 MILLIGRAM(S): at 05:46

## 2023-02-01 RX ADMIN — OXYCODONE HYDROCHLORIDE 10 MILLIGRAM(S): 5 TABLET ORAL at 10:23

## 2023-02-01 RX ADMIN — Medication 1000 MILLIGRAM(S): at 14:00

## 2023-02-01 RX ADMIN — OXYCODONE HYDROCHLORIDE 10 MILLIGRAM(S): 5 TABLET ORAL at 06:06

## 2023-02-01 RX ADMIN — OXYCODONE HYDROCHLORIDE 5 MILLIGRAM(S): 5 TABLET ORAL at 15:00

## 2023-02-01 RX ADMIN — APIXABAN 2.5 MILLIGRAM(S): 2.5 TABLET, FILM COATED ORAL at 10:07

## 2023-02-01 RX ADMIN — OXYCODONE HYDROCHLORIDE 10 MILLIGRAM(S): 5 TABLET ORAL at 11:19

## 2023-02-01 RX ADMIN — OXYCODONE HYDROCHLORIDE 10 MILLIGRAM(S): 5 TABLET ORAL at 01:51

## 2023-02-01 RX ADMIN — Medication 1000 MILLIGRAM(S): at 05:45

## 2023-02-01 RX ADMIN — Medication 100 MILLIGRAM(S): at 01:31

## 2023-02-01 RX ADMIN — OXYCODONE HYDROCHLORIDE 10 MILLIGRAM(S): 5 TABLET ORAL at 06:44

## 2023-02-01 RX ADMIN — OXYCODONE HYDROCHLORIDE 5 MILLIGRAM(S): 5 TABLET ORAL at 14:27

## 2023-02-01 RX ADMIN — OXYCODONE HYDROCHLORIDE 10 MILLIGRAM(S): 5 TABLET ORAL at 02:24

## 2023-02-01 RX ADMIN — PANTOPRAZOLE SODIUM 40 MILLIGRAM(S): 20 TABLET, DELAYED RELEASE ORAL at 05:47

## 2023-02-01 RX ADMIN — Medication 1000 MILLIGRAM(S): at 13:56

## 2023-02-01 RX ADMIN — Medication 101.6 MILLIGRAM(S): at 05:45

## 2023-02-01 NOTE — PROGRESS NOTE ADULT - ASSESSMENT
77F s/p right TKR    Right knee osteoarthritis s/p TKR  pain management, dvt ppx as per ortho, on eliquis  no medical contraindication to discharge home with home care    HTN  can resume home BP meds tomorrow    HLD  Continue statin    Allergies  can continue singulair if still taking it.

## 2023-02-01 NOTE — PHARMACOTHERAPY INTERVENTION NOTE - COMMENTS
HCTZ 12.5mg Qday  Admission medication reconciliation POD1 
Spoke to covering Ortho PA about use of celebrex with Patient's history of esopagheal surgery. PA has discontinued drug and will evaluate. 
Patient is s/p TKA. Patient has history of Barretts esophagus and Dieulafoy's vascular malformation. Spoke with BROOKS Greer, at Dr. Mancuso's office (GI). Celecoxib 100mg q12h x 2 weeks for multimodal pain management. Eliquis 2.5mg q12h x 2 weeks then ASA EC 81mg q12h x 2 weeks. Pantoprazole 40mg AMHS x 4 weeks.
Transition of Care video discharge education - medication calendar given to patient

## 2023-02-17 ENCOUNTER — APPOINTMENT (OUTPATIENT)
Dept: ORTHOPEDIC SURGERY | Facility: CLINIC | Age: 78
End: 2023-02-17
Payer: MEDICARE

## 2023-02-17 PROCEDURE — 73560 X-RAY EXAM OF KNEE 1 OR 2: CPT | Mod: RT

## 2023-02-17 PROCEDURE — 99024 POSTOP FOLLOW-UP VISIT: CPT

## 2023-02-17 RX ORDER — OXYCODONE 5 MG/1
5 TABLET ORAL
Qty: 10 | Refills: 0 | Status: ACTIVE | COMMUNITY
Start: 2023-02-13 | End: 1900-01-01

## 2023-03-07 ENCOUNTER — TRANSCRIPTION ENCOUNTER (OUTPATIENT)
Age: 78
End: 2023-03-07

## 2023-03-31 ENCOUNTER — APPOINTMENT (OUTPATIENT)
Dept: ORTHOPEDIC SURGERY | Facility: CLINIC | Age: 78
End: 2023-03-31

## 2023-03-31 ENCOUNTER — TRANSCRIPTION ENCOUNTER (OUTPATIENT)
Age: 78
End: 2023-03-31

## 2023-04-12 ENCOUNTER — APPOINTMENT (OUTPATIENT)
Dept: ORTHOPEDIC SURGERY | Facility: CLINIC | Age: 78
End: 2023-04-12
Payer: MEDICARE

## 2023-04-12 DIAGNOSIS — S86.811A STRAIN OF OTHER MUSCLE(S) AND TENDON(S) AT LOWER LEG LEVEL, RIGHT LEG, INITIAL ENCOUNTER: ICD-10-CM

## 2023-04-12 DIAGNOSIS — M19.079 PRIMARY OSTEOARTHRITIS, UNSPECIFIED ANKLE AND FOOT: ICD-10-CM

## 2023-04-12 PROCEDURE — 73562 X-RAY EXAM OF KNEE 3: CPT | Mod: RT

## 2023-04-12 PROCEDURE — 99213 OFFICE O/P EST LOW 20 MIN: CPT | Mod: 24

## 2023-04-19 ENCOUNTER — TRANSCRIPTION ENCOUNTER (OUTPATIENT)
Age: 78
End: 2023-04-19

## 2023-05-02 ENCOUNTER — TRANSCRIPTION ENCOUNTER (OUTPATIENT)
Age: 78
End: 2023-05-02

## 2023-05-09 ENCOUNTER — TRANSCRIPTION ENCOUNTER (OUTPATIENT)
Age: 78
End: 2023-05-09

## 2023-05-10 RX ORDER — CEPHALEXIN 500 MG/1
500 TABLET ORAL
Qty: 10 | Refills: 0 | Status: ACTIVE | COMMUNITY
Start: 2023-05-10 | End: 1900-01-01

## 2023-05-26 ENCOUNTER — APPOINTMENT (OUTPATIENT)
Dept: ORTHOPEDIC SURGERY | Facility: CLINIC | Age: 78
End: 2023-05-26
Payer: MEDICARE

## 2023-05-26 PROCEDURE — 73562 X-RAY EXAM OF KNEE 3: CPT | Mod: RT

## 2023-05-26 PROCEDURE — 99213 OFFICE O/P EST LOW 20 MIN: CPT

## 2023-08-04 ENCOUNTER — APPOINTMENT (OUTPATIENT)
Dept: ORTHOPEDIC SURGERY | Facility: CLINIC | Age: 78
End: 2023-08-04
Payer: MEDICARE

## 2023-08-04 PROCEDURE — 99214 OFFICE O/P EST MOD 30 MIN: CPT

## 2023-08-04 PROCEDURE — 73562 X-RAY EXAM OF KNEE 3: CPT | Mod: RT

## 2023-08-04 NOTE — HISTORY OF PRESENT ILLNESS
[de-identified] : 77-year-old female patient returns for a follow-up left knee and hip. She is 6 months s/p right TKR done on 01/31/23. She is currently attending formal PT. She wants to continue attending PT. She notes pain when she is in a bending position. She notes getting in and out of her car causes pain. She notes sitting for long period of time causes pain. She notes she hears "clicking."  She is also complaining of right foot pain.  PMHx of osteoporosis and refuses treatments, took Boniva in the past.   Radiology Results: 05/26/2023 o Right Knee : Standing AP, Lateral and skyline views of the knee were obtained and revealed excellent position of her right total knee replacement with sight lateral tracking of the patella.   Radiology Results from 5/9/2022: o Left Knee : Standing AP, lateral, tunnel, and skyline views of the knee were obtained and reveal severe lateral and patellofemoral arthritis.

## 2023-08-04 NOTE — ADDENDUM
[FreeTextEntry1] : I, Von Rivera, acted solely as a scribe for Dr. Fracisco Lopez on this date 05/26/2023.\par  \par  All medical record entries made by the Scribe were at my, Dr. Fracisco Lopez, direction and personally dictated by me on 05/26/2023. I have reviewed the chart and agree that the record accurately reflects my personal performance of the history, physical exam, assessment and plan. I have also personally directed, reviewed, and agreed with the chart.

## 2023-08-04 NOTE — DISCUSSION/SUMMARY
[de-identified] : I went over the pathophysiology of the patient's symptoms in great detail with the patient. I discussed the underlying pathophysiology of the patient's condition in great detail with the patient. I went over the patient's x-rays with them in great detail. At-home strengthening exercises were discussed and demonstrated with the patient. I am recommending the patient continues to go to physical therapy to obtain increases in their strength and mobility. A prescription was provided. We discussed the use of ice, Tylenol and anti-inflammatories to relieve pain.   All of their questions were answered. They understand and consent to the plan.   FU in 3 months. 
Universal Safety Interventions

## 2023-08-04 NOTE — REASON FOR VISIT
[Follow-Up Visit] : a follow-up visit for [Spouse] : spouse [FreeTextEntry2] : s/p right TKR done on 1/31/2023.

## 2023-08-04 NOTE — PHYSICAL EXAM
[LE] : Sensory: Intact in bilateral lower extremities [de-identified] : Constitutional o Appearance : well-nourished, well developed, alert, in no acute distress  Head and Face o Head :  Inspection : atraumatic, normocephalic o Face :  Inspection : no visible rash or discoloration Respiratory o Respiratory Effort: breathing unlabored  Neurologic o Sensation : Normal sensation  Psychiatric o Mood and Affect: mood normal, affect appropriate  Lymphatic o Additional Nodes : No palpable lymph nodes present   Right Lower Extremity o Buttock : no tenderness, swelling or deformities o Right Hip :  Inspection/Palpation : ITB tenderness, no swelling or deformities  Range of Motion : full and painless in all planes, no crepitance  Stability : joint stability intact  Strength : extension, flexion 4/5, adduction, abduction, internal rotation and external rotation  Tests: Jeffry's test negative   o Right Knee :  Inspection/Palpation : lateral compartment tenderness, no swelling, no warmth, no erythema, well-healed incision  Range of Motion : 0-125, no crepitance, good patellofemoral glide   Stability : no valgus or varus instability present on provocative testing  Strength : flexion and extension 4+/5, can SLR, no extensor lag  Tests and Signs : negative Anterior Drawer   Left Lower Extremity o Buttock : no tenderness, swelling or deformities  o Left Hip :  Inspection/Palpation : no tenderness, no swelling or deformities  Range of Motion : full and painless in all planes, no crepitance  Stability : joint stability intact  Strength : extension, flexion, adduction, abduction, internal rotation and external rotation, 5/5  Tests: Jeffry's test negative   o Left Knee :  Inspection/Palpation : no tenderness to palpation, no swelling, good patellofemoral glide   Range of Motion : 0-125 , no crepitance  Stability : no valgus or varus instability present on provocative testing  Strength : flexion and extension 5/5  Tests and Signs : negative Anterior Drawer, negative Lachman, negative Jacques   Gait: heel/toe on the right with a cane in the left hand, no significant extremity swelling or lymphedema, good proprioception and balance, no foot drop, good core strength  Radiology Results:  o Right Knee : Standing AP, lateral and tunnel views of the knee were obtained and revealed excellent position of TKR with slight lateral tracking of the patella

## 2023-09-09 NOTE — PRE-OP CHECKLIST - ANTIBIOTIC
Your x-rays were read by the provider. A radiologist will also read the x-rays and you will be notified of any abnormalities. Continue ice applications 48-29 min 2-3 times daily over the next 2-3 days. Continue Tylenol or Motrin for pain relief. Follow-up with ortho - referral placed. Follow-up with your PCP as needed. Go to the ED for any severely worsening symptoms.
ancef 2 gms/yes

## 2023-11-10 ENCOUNTER — APPOINTMENT (OUTPATIENT)
Dept: ORTHOPEDIC SURGERY | Facility: CLINIC | Age: 78
End: 2023-11-10
Payer: MEDICARE

## 2023-11-10 DIAGNOSIS — M76.891 OTHER SPECIFIED ENTHESOPATHIES OF RIGHT LOWER LIMB, EXCLUDING FOOT: ICD-10-CM

## 2023-11-10 DIAGNOSIS — M76.31 ILIOTIBIAL BAND SYNDROME, RIGHT LEG: ICD-10-CM

## 2023-11-10 PROCEDURE — 99213 OFFICE O/P EST LOW 20 MIN: CPT

## 2023-11-10 PROCEDURE — 73562 X-RAY EXAM OF KNEE 3: CPT | Mod: RT

## 2023-12-29 ENCOUNTER — APPOINTMENT (OUTPATIENT)
Dept: ORTHOPEDIC SURGERY | Facility: CLINIC | Age: 78
End: 2023-12-29

## 2024-01-23 ENCOUNTER — NON-APPOINTMENT (OUTPATIENT)
Age: 79
End: 2024-01-23

## 2024-02-14 ENCOUNTER — APPOINTMENT (OUTPATIENT)
Dept: ORTHOPEDIC SURGERY | Facility: CLINIC | Age: 79
End: 2024-02-14
Payer: MEDICARE

## 2024-02-14 VITALS — BODY MASS INDEX: 32.1 KG/M2 | WEIGHT: 170 LBS | HEIGHT: 61 IN

## 2024-02-14 DIAGNOSIS — M76.51 PATELLAR TENDINITIS, RIGHT KNEE: ICD-10-CM

## 2024-02-14 PROCEDURE — 99214 OFFICE O/P EST MOD 30 MIN: CPT

## 2024-02-14 PROCEDURE — 73562 X-RAY EXAM OF KNEE 3: CPT | Mod: RT

## 2024-02-14 NOTE — ADDENDUM
[FreeTextEntry1] : I, ORA MANCILLA, acted solely as a scribe for Dr. Fracisco Lopez on this date 02/14/2024.  All medical record entries made by the Scribe were at my, Dr. Fracisco Lopez, direction and personally dictated by me on 02/14/2024. I have reviewed the chart and agree that the record accurately reflects my personal performance of the history, physical exam, assessment and plan. I have also personally directed, reviewed, and agreed with the chart.								 .

## 2024-02-14 NOTE — HISTORY OF PRESENT ILLNESS
[de-identified] : 77-year-old female patient returns for a follow-up right knee follow-up. She is 1 year months s/p right TKR done on 01/31/23. She states her right knee hurts constantly. She states she has been walking and working on leg strength with 2 pound weights. She denies any swelling or buckling. She presents with her . She is not using a cane at this time. She is also complaining of back pain. She states she was not able to walk the other day due to the back pain. Patient states that she has difficulty when ascending and descending stairs.  She notes she aggravated her back as well in the past week or 2.  It was on the right side.  She is known to have stenosis of the lumbosacral spine. PMHx of osteoporosis and refuses treatments, took Boniva in the past.    Radiology Results:  o Right Knee : Standing AP, lateral and tunnel views of the knee were obtained and revealed excellent position of TKR with slight lateral tracking of the patella no change from her previous X-rays   Radiology Results: 08/04/2023 o Right Knee : Standing AP, lateral and tunnel views of the knee were obtained and revealed excellent position of TKR with slight lateral tracking of the patella   Radiology Results: 05/26/2023 o Right Knee : Standing AP, Lateral and skyline views of the knee were obtained and revealed excellent position of her right total knee replacement with sight lateral tracking of the patella.   Radiology Results from 5/9/2022: o Left Knee : Standing AP, lateral, tunnel, and skyline views of the knee were obtained and reveal severe lateral and patellofemoral arthritis.

## 2024-02-14 NOTE — DISCUSSION/SUMMARY
[de-identified] : I went over the pathophysiology of the patient's symptoms in great detail with the patient. I discussed the underlying pathophysiology of the patient's condition in great detail with the patient. I went over the patient's x-rays with them in great detail. At this time, she will start a course of physical therapy for strengthening and flexibility. A prescription was provided. Proper cane walking protocol was discussed and demonstrated with the patient. I warned her to be very careful when it becomes slippery and icy outside because a fall could result in serious consequences. She needs to avoid high-impact activities such as running and jumping or riding a treadmill. I recommend alternative activities such as riding a stationary bike or elliptical on low tension. She should focus on light weight and high repetition exercises. She should avoid squatting and kneeling.   All of their questions were answered. They understand and consent to the plan.   FU in 2 months.

## 2024-02-14 NOTE — PHYSICAL EXAM
[LE] : Sensory: Intact in bilateral lower extremities [de-identified] : Constitutional o Appearance : well-nourished, well developed, alert, in no acute distress  Head and Face o Head :  Inspection : atraumatic, normocephalic o Face :  Inspection : no visible rash or discoloration Respiratory o Respiratory Effort: breathing unlabored  Neurologic o Sensation : Normal sensation  Psychiatric o Mood and Affect: mood normal, affect appropriate  Lymphatic o Additional Nodes : No palpable lymph nodes present   Right Lower Extremity o Buttock : no tenderness, swelling or deformities o Right Hip :  Inspection/Palpation : ITB tenderness, no swelling or deformities  Range of Motion : full and painless in all planes, no crepitance  Stability : joint stability intact  Strength : extension, flexion 4/5, adduction, abduction, internal rotation and external rotation  Tests: Jeffry's test negative   o Right Knee :  Inspection/Palpation : medial and lateral facet tenderness , no swelling, no warmth, no erythema, well-healed incision  Range of Motion : 0-120, no crepitance, good patellofemoral glide   Stability : no valgus or varus instability present on provocative testing  Strength : flexion and extension 4/5, can SLR, no extensor lag  Tests and Signs : negative Anterior Drawer   Left Lower Extremity o Buttock : no tenderness, swelling or deformities  o Left Hip :  Inspection/Palpation : no tenderness, no swelling or deformities  Range of Motion : full and painless in all planes, no crepitance  Stability : joint stability intact  Strength : extension, flexion, adduction, abduction, internal rotation and external rotation, 5/5  Tests: Jeffry's test negative   o Left Knee :  Inspection/Palpation : no tenderness to palpation, no swelling, good patellofemoral glide   Range of Motion : 0-125 , no crepitance  Stability : no valgus or varus instability present on provocative testing  Strength : flexion and extension 4+/5  Tests and Signs : negative Anterior Drawer, negative Lachman, negative Jacques   Gait: heel/toe, does not use a cane, no significant extremity swelling or lymphedema, good proprioception and balance, no foot drop, good core strength  Radiology Results 2/14/2024  o Right Knee : Standing AP, lateral and skyline views of the knee were obtained and revealed excellent position of the total knee replacement with slight lateral tracking of the patella.

## 2024-04-17 ENCOUNTER — APPOINTMENT (OUTPATIENT)
Dept: ORTHOPEDIC SURGERY | Facility: CLINIC | Age: 79
End: 2024-04-17
Payer: MEDICARE

## 2024-04-17 VITALS — WEIGHT: 170 LBS | HEIGHT: 61 IN | BODY MASS INDEX: 32.1 KG/M2

## 2024-04-17 DIAGNOSIS — M17.0 BILATERAL PRIMARY OSTEOARTHRITIS OF KNEE: ICD-10-CM

## 2024-04-17 PROCEDURE — 99214 OFFICE O/P EST MOD 30 MIN: CPT

## 2024-04-17 PROCEDURE — 72100 X-RAY EXAM L-S SPINE 2/3 VWS: CPT

## 2024-04-17 PROCEDURE — 72170 X-RAY EXAM OF PELVIS: CPT

## 2024-04-17 NOTE — ADDENDUM
[FreeTextEntry1] : I, ORA MANCILLA, acted solely as a scribe for Dr. Fracisco Lopez on this date 04/17/2024.  All medical record entries made by the Scribe were at my, Dr. Fracisco Lopez, direction and personally dictated by me on 04/17/2024. I have reviewed the chart and agree that the record accurately reflects my personal performance of the history, physical exam, assessment and plan. I have also personally directed, reviewed, and agreed with the chart.								 								 .

## 2024-04-17 NOTE — PHYSICAL EXAM
[LE] : Sensory: Intact in bilateral lower extremities [de-identified] : Constitutional o Appearance : well-nourished, well developed, alert, in no acute distress  Head and Face o Head :  Inspection : atraumatic, normocephalic o Face :  Inspection : no visible rash or discoloration Respiratory o Respiratory Effort: breathing unlabored  Neurologic o Sensation : Normal sensation  Psychiatric o Mood and Affect: mood normal, affect appropriate  Lymphatic o Additional Nodes : No palpable lymph nodes present   Lum bosacral Spine o Inspection : no visible rash or discoloration o Palpation : left paraspinal muscle tenderness,  o Range of Motion : extension causes mild discomfort,  o Muscle Strength : paraspinal muscle strength and tone within normal limits o Muscle Tone : paraspinal muscle strength and tone within normal limits o Tests: straight leg test negative bilaterally, LIAT test negative bilaterally   Right Lower Extremity o Buttock : no tenderness, swelling or deformities o Right Hip :  Inspection/Palpation : ITB tenderness, no swelling or deformities  Range of Motion : full and painless in all planes, no crepitance  Stability : joint stability intact  Strength : extension, flexion 4/5, adduction, abduction, internal rotation and external rotation  Tests: Jeffry's test negative   o Right Knee :  Inspection/Palpation : medial and lateral facet tenderness , no swelling, no warmth, no erythema, well-healed incision  Range of Motion : 0-125, no crepitance, good patellofemoral glide   Stability : no valgus or varus instability present on provocative testing  Strength : flexion and extension 4/5, can SLR, no extensor lag  Tests and Signs : negative Anterior Drawer   Left Lower Extremity o Buttock : no tenderness, swelling or deformities  o Left Hip :  Inspection/Palpation : no tenderness, no swelling or deformities  Range of Motion : full and painless in all planes, no crepitance  Stability : joint stability intact  Strength : extension, flexion, adduction, abduction, internal rotation and external rotation, 4/5  Tests: Jeffry's test negative   o Left Knee :  Inspection/Palpation : no tenderness to palpation, no swelling, good patellofemoral glide   Range of Motion : 0-125 , no crepitance  Stability : no valgus or varus instability present on provocative testing  Strength : flexion and extension 4+/5  Tests and Signs : negative Anterior Drawer, negative Lachman, negative Jacques   Gait: heel/toe, does not use a cane, negative LIAT TEST, mild positive LIAT TEST on the left, negative SLR TEST, no significant extremity swelling or lymphedema, good proprioception and balance, no foot drop, limited core strength  Radiology Results 4/17/2024 o Lumbosacral Spine : AP and lateral views were obtained and revealed significant foraminal stenosis of L4-5 and degenerative disc disease of L5-S1 and osteopenia, no significant change since 2019  o Pelvis : AP pelvis was obtained and revealed 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

## 2024-04-17 NOTE — DISCUSSION/SUMMARY
[de-identified] : I went over the pathophysiology of the patient's symptoms in great detail with the patient. I discussed the underlying pathophysiology of the patient's condition in great detail with the patient. I went over the patient's x-rays with them in great detail. At this time, she will start a course of physical therapy for strengthening and flexibility. A prescription was provided. We discussed the use of ice, Tylenol and anti-inflammatories to relieve pain.   All of their questions were answered. They understand and consent to the plan.   FU in 4-6 weeks. We will consider an MRI of her lumbar spine upon return.

## 2024-04-17 NOTE — HISTORY OF PRESENT ILLNESS
[de-identified] : 77-year-old female patient returns with lower back pain. She states her lower back locked up on easter Sunday. Patient denies that she has any numbness or tingling. She denies radiating pain down her legs. She has gone to physical therapy multiple times for her back. She is s/p right TKR done on 01/31/23.  She is thrilled with her progress and results. She has been doing 5 pounds weights for the knees. She is known to have stenosis of the lumbosacral spine.   PMHx of osteoporosis and refuses treatments, took Boniva in the past.   Radiology Results 2/14/2024  o Right Knee : Standing AP, lateral and skyline views of the knee were obtained and revealed excellent position of the total knee replacement with slight lateral tracking of the patella.    Radiology Results:  o Right Knee : Standing AP, lateral and tunnel views of the knee were obtained and revealed excellent position of TKR with slight lateral tracking of the patella no change from her previous X-rays   Radiology Results: 08/04/2023 o Right Knee : Standing AP, lateral and tunnel views of the knee were obtained and revealed excellent position of TKR with slight lateral tracking of the patella   Radiology Results: 05/26/2023 o Right Knee : Standing AP, Lateral and skyline views of the knee were obtained and revealed excellent position of her right total knee replacement with sight lateral tracking of the patella.   Radiology Results from 5/9/2022: o Left Knee : Standing AP, lateral, tunnel, and skyline views of the knee were obtained and reveal severe lateral and patellofemoral arthritis.

## 2024-05-31 ENCOUNTER — APPOINTMENT (OUTPATIENT)
Dept: GASTROENTEROLOGY | Facility: CLINIC | Age: 79
End: 2024-05-31
Payer: MEDICARE

## 2024-05-31 VITALS
DIASTOLIC BLOOD PRESSURE: 70 MMHG | SYSTOLIC BLOOD PRESSURE: 118 MMHG | WEIGHT: 168 LBS | HEIGHT: 61 IN | BODY MASS INDEX: 31.72 KG/M2

## 2024-05-31 DIAGNOSIS — Z12.11 ENCOUNTER FOR SCREENING FOR MALIGNANT NEOPLASM OF COLON: ICD-10-CM

## 2024-05-31 DIAGNOSIS — K22.70 BARRETT'S ESOPHAGUS W/OUT DYSPLASIA: ICD-10-CM

## 2024-05-31 PROCEDURE — 99204 OFFICE O/P NEW MOD 45 MIN: CPT

## 2024-05-31 RX ORDER — SODIUM SULFATE, MAGNESIUM SULFATE, AND POTASSIUM CHLORIDE 17.75; 2.7; 2.25 G/1; G/1; G/1
1479-225-188 TABLET ORAL
Qty: 24 | Refills: 0 | Status: ACTIVE | COMMUNITY
Start: 2024-05-31 | End: 1900-01-01

## 2024-05-31 NOTE — HISTORY OF PRESENT ILLNESS
[FreeTextEntry1] : Ms. DELPHINE BEAULIEU is a 78 year old female with history of Cowan's esophagus.  Patient had last endoscopy report in 2021.  Patient has no complaints of heartburn or dysphagia.  Patient had last colonoscopy in 2017.  Patient does have a history of upper GI bleeding related to esophagitis in the remote past.  No complaints of bowel issues.  No family history of colon cancer.

## 2024-05-31 NOTE — ASSESSMENT
[FreeTextEntry1] : 77 yo female screening colonoscopy with history of Cowan's esophagus. Arrange colonoscopy and upper endoscopy.

## 2024-05-31 NOTE — REASON FOR VISIT
[Consultation] : a consultation visit [FreeTextEntry1] : screening colonoscopy and history of Cowan's esophagus

## 2024-05-31 NOTE — PHYSICAL EXAM

## 2024-06-05 ENCOUNTER — APPOINTMENT (OUTPATIENT)
Dept: ORTHOPEDIC SURGERY | Facility: CLINIC | Age: 79
End: 2024-06-05
Payer: MEDICARE

## 2024-06-05 VITALS — HEIGHT: 61 IN | BODY MASS INDEX: 31.72 KG/M2 | WEIGHT: 168 LBS

## 2024-06-05 DIAGNOSIS — M47.817 SPONDYLOSIS W/OUT MYELOPATHY OR RADICULOPATHY, LUMBOSACRAL REGION: ICD-10-CM

## 2024-06-05 DIAGNOSIS — Z96.651 PRESENCE OF RIGHT ARTIFICIAL KNEE JOINT: ICD-10-CM

## 2024-06-05 DIAGNOSIS — M16.0 BILATERAL PRIMARY OSTEOARTHRITIS OF HIP: ICD-10-CM

## 2024-06-05 DIAGNOSIS — M48.061 SPINAL STENOSIS, LUMBAR REGION WITHOUT NEUROGENIC CLAUDICATION: ICD-10-CM

## 2024-06-05 PROCEDURE — 99214 OFFICE O/P EST MOD 30 MIN: CPT

## 2024-06-05 NOTE — ADDENDUM
[FreeTextEntry1] : I, ORA MANCILLA, acted solely as a scribe for Dr. Fracisco Lopez on this date 06/05/2024.  All medical record entries made by the Scribe were at my, Dr. Fracisco Lopez, direction and personally dictated by me on 06/05/2024. I have reviewed the chart and agree that the record accurately reflects my personal performance of the history, physical exam, assessment and plan. I have also personally directed, reviewed, and agreed with the chart.

## 2024-06-05 NOTE — DISCUSSION/SUMMARY
[de-identified] : I went over the pathophysiology of the patient's symptoms in great detail with the patient. I am recommending the patient continues to go to physical therapy to obtain increases in their strength and mobility. A prescription was provided. No surgical intervention is recommended at this time. We discussed the use of ice, Tylenol and anti-inflammatories to relieve pain. At-home strengthening exercises were discussed and demonstrated with the patient. She should focus on light weight and high repetition exercises.   All of their questions were answered. They understand and consent to the plan.  FU in 6-8 weeks.

## 2024-06-05 NOTE — PHYSICAL EXAM
[LE] : Sensory: Intact in bilateral lower extremities [de-identified] : Constitutional o Appearance : well-nourished, well developed, alert, in no acute distress  Head and Face o Head :  Inspection : atraumatic, normocephalic o Face :  Inspection : no visible rash or discoloration Respiratory o Respiratory Effort: breathing unlabored  Neurologic o Sensation : Normal sensation  Psychiatric o Mood and Affect: mood normal, affect appropriate  Lymphatic o Additional Nodes : No palpable lymph nodes present   Lum bosacral Spine o Inspection : no visible rash or discoloration o Palpation : , sciatic notch tenderness and right SIJ tenderness  o Range of Motion : extension causes mild discomfort, side bending to the right radiating pain to the right leg, rotation causes lower back discomfort  o Muscle Strength : paraspinal muscle strength and tone within normal limits o Muscle Tone : paraspinal muscle strength and tone within normal limits o Tests: straight leg test negative bilaterally, LIAT test negative bilaterally   Right Lower Extremity o Buttock : no tenderness, swelling or deformities o Right Hip :  Inspection/Palpation : ITB tenderness, no swelling or deformities  Range of Motion : full and painless in all planes, no crepitance  Stability : joint stability intact  Strength : extension, flexion 4/5, adduction, abduction, internal rotation and external rotation  Tests: Jeffry's test negative   o Right Knee :  Inspection/Palpation : medial and lateral facet tenderness , no swelling, no warmth, no erythema, well-healed incision  Range of Motion : 0-128, no crepitance, good patellofemoral glide   Stability : no valgus or varus instability present on provocative testing  Strength : flexion and extension 4/5, can SLR, no extensor lag  Tests and Signs : negative Anterior Drawer   Left Lower Extremity o Buttock : no tenderness, swelling or deformities  o Left Hip :  Inspection/Palpation : no tenderness, no swelling or deformities  Range of Motion : full and painless in all planes, no crepitance  Stability : joint stability intact  Strength : extension, flexion, adduction, abduction, internal rotation and external rotation, 4+/5  Tests: Jeffry's test negative   o Left Knee :  Inspection/Palpation : no tenderness to palpation, no swelling, good patellofemoral glide   Range of Motion : 0-125 , no crepitance  Stability : no valgus or varus instability present on provocative testing  Strength : flexion and extension 4+/5  Tests and Signs : negative Anterior Drawer, negative Lachman, negative Jacques   Gait: no foot drop, ankle strength 5/5, no cane, good core strength, normal sensation to light touch,

## 2024-07-05 ENCOUNTER — NON-APPOINTMENT (OUTPATIENT)
Age: 79
End: 2024-07-05

## 2024-07-11 ENCOUNTER — APPOINTMENT (OUTPATIENT)
Dept: ORTHOPEDIC SURGERY | Facility: CLINIC | Age: 79
End: 2024-07-11
Payer: MEDICARE

## 2024-07-11 DIAGNOSIS — M48.061 SPINAL STENOSIS, LUMBAR REGION WITHOUT NEUROGENIC CLAUDICATION: ICD-10-CM

## 2024-07-11 DIAGNOSIS — M17.0 BILATERAL PRIMARY OSTEOARTHRITIS OF KNEE: ICD-10-CM

## 2024-07-11 DIAGNOSIS — S80.01XD CONTUSION OF RIGHT KNEE, SUBSEQUENT ENCOUNTER: ICD-10-CM

## 2024-07-11 PROCEDURE — 99214 OFFICE O/P EST MOD 30 MIN: CPT

## 2024-07-11 PROCEDURE — 73562 X-RAY EXAM OF KNEE 3: CPT | Mod: RT

## 2024-08-16 NOTE — ADDENDUM
Contract on file    Rx Refill Note  Requested Prescriptions     Pending Prescriptions Disp Refills    ALPRAZolam (XANAX) 0.5 MG tablet [Pharmacy Med Name: ALPRAZOLAM 0.5 MG TABLET] 30 tablet      Sig: TAKE ONE TABLET BY MOUTH ONCE NIGHTLY AS NEEDED FOR ANXIETY      Last office visit with prescribing clinician: 8/13/2024   Last telemedicine visit with prescribing clinician: Visit date not found   Next office visit with prescribing clinician: 2/13/2025       Maye Storey MA  08/16/24, 15:36 EDT     [FreeTextEntry1] : I, Von Rivera, acted solely as a scribe for Dr. Fracisco Lopez on this date 04/01/2022.\par All medical record entries made by the Scribe were at my, Dr. Fracisco Lopez, direction and personally dictated by me on 04/01/2022. I have reviewed the chart and agree that the record accurately reflects my personal performance of the history, physical exam, assessment and plan. I have also personally directed, reviewed, and agreed with the chart.

## 2024-10-09 ENCOUNTER — APPOINTMENT (OUTPATIENT)
Dept: ORTHOPEDIC SURGERY | Facility: CLINIC | Age: 79
End: 2024-10-09
Payer: MEDICARE

## 2024-10-09 VITALS — WEIGHT: 168 LBS | HEIGHT: 61 IN | BODY MASS INDEX: 31.72 KG/M2

## 2024-10-09 DIAGNOSIS — M47.817 SPONDYLOSIS W/OUT MYELOPATHY OR RADICULOPATHY, LUMBOSACRAL REGION: ICD-10-CM

## 2024-10-09 DIAGNOSIS — M76.31 ILIOTIBIAL BAND SYNDROME, RIGHT LEG: ICD-10-CM

## 2024-10-09 DIAGNOSIS — M76.891 OTHER SPECIFIED ENTHESOPATHIES OF RIGHT LOWER LIMB, EXCLUDING FOOT: ICD-10-CM

## 2024-10-09 DIAGNOSIS — Z96.651 PRESENCE OF RIGHT ARTIFICIAL KNEE JOINT: ICD-10-CM

## 2024-10-09 PROCEDURE — 99214 OFFICE O/P EST MOD 30 MIN: CPT

## 2024-11-19 ENCOUNTER — APPOINTMENT (OUTPATIENT)
Dept: GASTROENTEROLOGY | Facility: AMBULATORY MEDICAL SERVICES | Age: 79
End: 2024-11-19

## 2024-11-19 ENCOUNTER — RESULT REVIEW (OUTPATIENT)
Age: 79
End: 2024-11-19

## 2024-11-19 PROCEDURE — 43239 EGD BIOPSY SINGLE/MULTIPLE: CPT

## 2024-11-19 PROCEDURE — 45378 DIAGNOSTIC COLONOSCOPY: CPT | Mod: PT

## 2024-12-04 ENCOUNTER — APPOINTMENT (OUTPATIENT)
Dept: ORTHOPEDIC SURGERY | Facility: CLINIC | Age: 79
End: 2024-12-04
Payer: MEDICARE

## 2024-12-04 VITALS — HEIGHT: 61 IN | BODY MASS INDEX: 25.49 KG/M2 | WEIGHT: 135 LBS

## 2024-12-04 DIAGNOSIS — M48.061 SPINAL STENOSIS, LUMBAR REGION WITHOUT NEUROGENIC CLAUDICATION: ICD-10-CM

## 2024-12-04 DIAGNOSIS — M47.817 SPONDYLOSIS W/OUT MYELOPATHY OR RADICULOPATHY, LUMBOSACRAL REGION: ICD-10-CM

## 2024-12-04 DIAGNOSIS — M19.049 PRIMARY OSTEOARTHRITIS, UNSPECIFIED HAND: ICD-10-CM

## 2024-12-04 PROCEDURE — 99214 OFFICE O/P EST MOD 30 MIN: CPT

## 2025-03-19 ENCOUNTER — APPOINTMENT (OUTPATIENT)
Dept: ORTHOPEDIC SURGERY | Facility: CLINIC | Age: 80
End: 2025-03-19

## 2025-03-26 ENCOUNTER — APPOINTMENT (OUTPATIENT)
Dept: ORTHOPEDIC SURGERY | Facility: CLINIC | Age: 80
End: 2025-03-26
Payer: MEDICARE

## 2025-03-26 VITALS — WEIGHT: 126 LBS | HEIGHT: 61 IN | BODY MASS INDEX: 23.79 KG/M2

## 2025-03-26 DIAGNOSIS — M48.061 SPINAL STENOSIS, LUMBAR REGION WITHOUT NEUROGENIC CLAUDICATION: ICD-10-CM

## 2025-03-26 DIAGNOSIS — Z96.651 PRESENCE OF RIGHT ARTIFICIAL KNEE JOINT: ICD-10-CM

## 2025-03-26 DIAGNOSIS — M41.50 OTHER SECONDARY SCOLIOSIS, SITE UNSPECIFIED: ICD-10-CM

## 2025-03-26 PROCEDURE — 99214 OFFICE O/P EST MOD 30 MIN: CPT

## 2025-03-26 PROCEDURE — 72170 X-RAY EXAM OF PELVIS: CPT

## 2025-03-26 PROCEDURE — 73562 X-RAY EXAM OF KNEE 3: CPT | Mod: RT

## 2025-03-26 PROCEDURE — 72100 X-RAY EXAM L-S SPINE 2/3 VWS: CPT

## 2025-06-02 ENCOUNTER — APPOINTMENT (OUTPATIENT)
Dept: SURGERY | Facility: CLINIC | Age: 80
End: 2025-06-02
Payer: MEDICARE

## 2025-06-02 ENCOUNTER — NON-APPOINTMENT (OUTPATIENT)
Age: 80
End: 2025-06-02

## 2025-06-02 VITALS
RESPIRATION RATE: 16 BRPM | HEIGHT: 61 IN | SYSTOLIC BLOOD PRESSURE: 122 MMHG | OXYGEN SATURATION: 98 % | WEIGHT: 122 LBS | HEART RATE: 90 BPM | DIASTOLIC BLOOD PRESSURE: 80 MMHG | BODY MASS INDEX: 23.03 KG/M2 | TEMPERATURE: 98.1 F

## 2025-06-02 DIAGNOSIS — M79.89 OTHER SPECIFIED SOFT TISSUE DISORDERS: ICD-10-CM

## 2025-06-02 PROCEDURE — 99202 OFFICE O/P NEW SF 15 MIN: CPT

## 2025-06-02 RX ORDER — FAMOTIDINE 40 MG/1
40 TABLET, FILM COATED ORAL
Refills: 0 | Status: ACTIVE | COMMUNITY

## 2025-06-02 RX ORDER — SEMAGLUTIDE 1.7 MG/.75ML
1.7 INJECTION, SOLUTION SUBCUTANEOUS
Qty: 3 | Refills: 0 | Status: ACTIVE | COMMUNITY
Start: 2025-04-30

## 2025-06-02 RX ORDER — PANTOPRAZOLE 40 MG/1
40 TABLET, DELAYED RELEASE ORAL
Refills: 0 | Status: ACTIVE | COMMUNITY

## 2025-06-02 RX ORDER — MONTELUKAST 10 MG/1
10 TABLET, FILM COATED ORAL
Refills: 0 | Status: ACTIVE | COMMUNITY

## 2025-06-02 RX ORDER — OLMESARTAN MEDOXOMIL 40 MG/1
40 TABLET, FILM COATED ORAL
Refills: 0 | Status: ACTIVE | COMMUNITY

## 2025-06-02 RX ORDER — SEMAGLUTIDE 1 MG/.5ML
1 INJECTION, SOLUTION SUBCUTANEOUS
Qty: 2 | Refills: 0 | Status: ACTIVE | COMMUNITY
Start: 2025-05-20

## 2025-06-02 RX ORDER — LABETALOL HYDROCHLORIDE 200 MG/1
200 TABLET, FILM COATED ORAL
Refills: 0 | Status: ACTIVE | COMMUNITY

## 2025-06-02 RX ORDER — HYDROCHLOROTHIAZIDE 12.5 MG/1
12.5 CAPSULE ORAL
Refills: 0 | Status: ACTIVE | COMMUNITY

## 2025-07-02 ENCOUNTER — APPOINTMENT (OUTPATIENT)
Dept: ORTHOPEDIC SURGERY | Facility: CLINIC | Age: 80
End: 2025-07-02
Payer: MEDICARE

## 2025-07-02 PROBLEM — M50.90 CERVICAL DISC DISEASE: Status: ACTIVE | Noted: 2025-07-02

## 2025-07-02 PROCEDURE — 72040 X-RAY EXAM NECK SPINE 2-3 VW: CPT

## 2025-07-02 PROCEDURE — 73562 X-RAY EXAM OF KNEE 3: CPT | Mod: RT

## 2025-07-02 PROCEDURE — 99214 OFFICE O/P EST MOD 30 MIN: CPT

## 2025-09-10 ENCOUNTER — APPOINTMENT (OUTPATIENT)
Dept: ORTHOPEDIC SURGERY | Facility: CLINIC | Age: 80
End: 2025-09-10
Payer: MEDICARE

## 2025-09-10 DIAGNOSIS — M47.812 SPONDYLOSIS W/OUT MYELOPATHY OR RADICULOPATHY, CERVICAL REGION: ICD-10-CM

## 2025-09-10 PROCEDURE — 99213 OFFICE O/P EST LOW 20 MIN: CPT

## 2025-09-10 RX ORDER — CYCLOBENZAPRINE 10 MG/1
10 TABLET ORAL TWICE DAILY
Qty: 90 | Refills: 0 | Status: ACTIVE | COMMUNITY
Start: 2025-09-10 | End: 1900-01-01

## (undated) DEVICE — DRAIN JACKSON PRATT 3 SPRING RESERVOIR W 10FR PVC DRAIN

## (undated) DEVICE — ELCTR STRYKER NEPTUNE SMOKE EVACUATION PENCIL (GREEN)

## (undated) DEVICE — SUT POLYSORB 2-0 30" GS-11 UNDYED

## (undated) DEVICE — SYR LUER LOK 50CC

## (undated) DEVICE — SUT MONOSOF 3-0 30" C-16

## (undated) DEVICE — POSITIONER STIRRUP STRAP W SLIP RING 19X3.5"

## (undated) DEVICE — TOURNIQUET CUFF 34" SINGLE PORT W PLC (BLACK)

## (undated) DEVICE — PACK TOTAL KNEE

## (undated) DEVICE — SOL IRR BAG NS 0.9% 3000ML

## (undated) DEVICE — WARMING BLANKET UPPER ADULT

## (undated) DEVICE — SUT POLYSORB 1 27" GS-12 UNDYED

## (undated) DEVICE — BLADE SCALPEL SAFETYLOCK #10

## (undated) DEVICE — CRYO/CUFF GRAVITY COOLER KNEE LARGE

## (undated) DEVICE — DRILL BIT BIOMET QUICK CONNECT 1/8

## (undated) DEVICE — SOL IRR BAG NS 0.9% 1000ML

## (undated) DEVICE — DRSG COMBINE 5X9"

## (undated) DEVICE — KIT OPTIVAC CEMENT MIXER 40GM

## (undated) DEVICE — HANDPIECE INTERPULSE W MULTI TIP

## (undated) DEVICE — WOUND IRR IRRISEPT W 0.5 CHG

## (undated) DEVICE — HOOD FLYTE STRYKER HELMET SHIELD

## (undated) DEVICE — MARKING PEN W RULER

## (undated) DEVICE — SOL IRR POUR NS 0.9% 500ML

## (undated) DEVICE — POSITIONER STRAP ARMBOARD VELCRO TS-30

## (undated) DEVICE — DRSG SILVERLON ISLAND 4X6" 2X4" PAD

## (undated) DEVICE — NDL SPINAL 18G X 3.5" (PINK)

## (undated) DEVICE — DRSG SILVERLON ISLAND 4X10" 2X8" PAD

## (undated) DEVICE — SOL IRR POUR H2O 1500ML

## (undated) DEVICE — SUT PDS II 1 27" CT-1

## (undated) DEVICE — VENODYNE/SCD SLEEVE CALF MEDIUM

## (undated) DEVICE — DRSG DERMABOND PRINEO 60CM

## (undated) DEVICE — SPECIMEN CONTAINER PET

## (undated) DEVICE — SYM-STRYKER SYSTEM 7: Type: DURABLE MEDICAL EQUIPMENT

## (undated) DEVICE — DRAPE 3/4 SHEET 52X76"

## (undated) DEVICE — SOLIDIFIER CANN EXPRESS 3K